# Patient Record
Sex: FEMALE | Race: WHITE | NOT HISPANIC OR LATINO | Employment: OTHER | URBAN - METROPOLITAN AREA
[De-identification: names, ages, dates, MRNs, and addresses within clinical notes are randomized per-mention and may not be internally consistent; named-entity substitution may affect disease eponyms.]

---

## 2017-03-06 ENCOUNTER — RESULTS ENCOUNTER (OUTPATIENT)
Dept: FAMILY MEDICINE CLINIC | Facility: CLINIC | Age: 52
End: 2017-03-06

## 2017-03-06 DIAGNOSIS — E78.00 HYPERCHOLESTEROLEMIA: ICD-10-CM

## 2017-03-17 ENCOUNTER — HOSPITAL ENCOUNTER (OUTPATIENT)
Dept: MAMMOGRAPHY | Facility: HOSPITAL | Age: 52
Discharge: HOME | End: 2017-03-17
Attending: NURSE PRACTITIONER | Admitting: NURSE PRACTITIONER

## 2017-03-17 ENCOUNTER — HOSPITAL ENCOUNTER (OUTPATIENT)
Dept: ULTRASOUND IMAGING | Facility: HOSPITAL | Age: 52
Discharge: HOME | End: 2017-03-17

## 2017-03-17 DIAGNOSIS — R92.8 ABNORMAL MAMMOGRAM: ICD-10-CM

## 2017-03-17 PROCEDURE — 76642 ULTRASOUND BREAST LIMITED: CPT | Performed by: RADIOLOGY

## 2017-03-17 PROCEDURE — 77062 BREAST TOMOSYNTHESIS BI: CPT | Performed by: RADIOLOGY

## 2017-03-17 PROCEDURE — G0204 DX MAMMO INCL CAD BI: HCPCS

## 2017-03-17 PROCEDURE — 77066 DX MAMMO INCL CAD BI: CPT | Performed by: RADIOLOGY

## 2017-03-17 PROCEDURE — G0279 TOMOSYNTHESIS, MAMMO: HCPCS

## 2017-03-17 PROCEDURE — 76642 ULTRASOUND BREAST LIMITED: CPT

## 2017-08-28 ENCOUNTER — TELEPHONE (OUTPATIENT)
Dept: FAMILY MEDICINE CLINIC | Facility: CLINIC | Age: 52
End: 2017-08-28

## 2017-08-28 DIAGNOSIS — E78.00 HYPERCHOLESTEROLEMIA: Primary | ICD-10-CM

## 2017-08-28 NOTE — TELEPHONE ENCOUNTER
Please call.  Orders placed.  I placed an order for a blood count, comprehensive metabolic panel for liver, kidney function and sugar and also lipid panel.

## 2017-08-28 NOTE — TELEPHONE ENCOUNTER
----- Message from Kerrie Spears sent at 8/28/2017 10:21 AM EDT -----  Contact: SHANTELL ALLEN  PATIENT HAS SCHEDULED HER MED REV APPOINTMENT ON 9 1 17 IS WANTING THE LAB ORDERS WRITTEN TODAY SO SHE CAN HAVE HER LABS DONE ASAP BEFORE HER APPOINTMENT PATIENT CAN BE REACHED [phone]

## 2017-08-29 ENCOUNTER — RESULTS ENCOUNTER (OUTPATIENT)
Dept: FAMILY MEDICINE CLINIC | Facility: CLINIC | Age: 52
End: 2017-08-29

## 2017-08-29 ENCOUNTER — TELEPHONE (OUTPATIENT)
Dept: FAMILY MEDICINE CLINIC | Facility: CLINIC | Age: 52
End: 2017-08-29

## 2017-08-29 DIAGNOSIS — E78.00 HYPERCHOLESTEROLEMIA: ICD-10-CM

## 2017-08-29 DIAGNOSIS — E78.00 HYPERCHOLESTEROLEMIA: Primary | ICD-10-CM

## 2017-08-29 NOTE — TELEPHONE ENCOUNTER
SPOKE WITH PT AND SHE WENT TO [facility] TO HAVE LABS DRAWN AND ORDERS WEREN'T SIGNED BY DR. 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

## 2017-08-29 NOTE — TELEPHONE ENCOUNTER
I have reprinted and signed orders. I thought she was having drawn here so signature would not have been needed. [email or web address]

## 2017-09-01 ENCOUNTER — OFFICE VISIT (OUTPATIENT)
Dept: FAMILY MEDICINE CLINIC | Facility: CLINIC | Age: 52
End: 2017-09-01

## 2017-09-01 VITALS
SYSTOLIC BLOOD PRESSURE: 142 MMHG | RESPIRATION RATE: 12 BRPM | TEMPERATURE: 97.3 F | OXYGEN SATURATION: 96 % | WEIGHT: 160.5 LBS | HEIGHT: 64 IN | BODY MASS INDEX: 27.4 KG/M2 | HEART RATE: 91 BPM | DIASTOLIC BLOOD PRESSURE: 88 MMHG

## 2017-09-01 DIAGNOSIS — E78.00 HYPERCHOLESTEROLEMIA: Primary | ICD-10-CM

## 2017-09-01 DIAGNOSIS — R73.9 HYPERGLYCEMIA: ICD-10-CM

## 2017-09-01 PROCEDURE — 99214 OFFICE O/P EST MOD 30 MIN: CPT | Performed by: FAMILY MEDICINE

## 2017-09-01 RX ORDER — ATORVASTATIN CALCIUM 20 MG/1
20 TABLET, FILM COATED ORAL DAILY
Qty: 90 TABLET | Refills: 3 | Status: SHIPPED | OUTPATIENT
Start: 2017-09-01 | End: 2018-11-29 | Stop reason: SDUPTHER

## 2017-09-01 NOTE — PROGRESS NOTES
"  Chief Complaint   Patient presents with   • Hyperlipidemia   • Med Refill       Subjective     Hyperlipidemia   This is a chronic problem. The current episode started more than 1 year ago. The problem is controlled. Recent lipid tests were reviewed and are normal. She has no history of chronic renal disease, diabetes, hypothyroidism or obesity. There are no known factors aggravating her hyperlipidemia. Pertinent negatives include no myalgias. Current antihyperlipidemic treatment includes statins. The current treatment provides moderate improvement of lipids. There are no compliance problems.  There are no known risk factors for coronary artery disease.       Hyperlipidemia  on lipitor  no side effects  had [place] night before the labs    Hyperglycemia  no increased freq urine  no increased thirst  no wt loss  + milkshakes 2 times per week  some sweets      had knee surg last summer, + OA in left knee, gel injections x 2. Last was 2 weeks ago and helps and tolerable    tube in left ear after increased flying. Dx ETD and meniere's diz. + helped with tube.   Dr Vo did this  no dizziness now, sporadic now  hearing has improved      Boot camp exercise and gained some weight, 3 pounds        Past Medical History,Medications, Allergies, and social history was reviewed.    Review of Systems   Constitutional: Negative.    HENT: Negative.    Respiratory: Negative.    Cardiovascular: Negative.    Gastrointestinal: Negative.    Musculoskeletal: Negative.  Negative for myalgias.   Neurological: Negative.    Psychiatric/Behavioral: Negative.        Objective     Vitals:    09/01/17 1124 09/01/17 1149   BP: 140/76 142/88   Pulse: 91    Resp: 12    Temp: 97.3 °F (36.3 °C)    TempSrc: Temporal Artery     SpO2: 96%    Weight: 160 lb 8 oz (72.8 kg)    Height: 64.25\" (163.2 cm)         Physical Exam   Constitutional: She is oriented to person, place, and time. She appears well-developed and well-nourished.   HENT:   Head: " Normocephalic and atraumatic.   Right Ear: Hearing, tympanic membrane, external ear and ear canal normal.   Left Ear: Hearing, tympanic membrane, external ear and ear canal normal.   Mouth/Throat: Oropharynx is clear and moist.   Eyes: Conjunctivae and EOM are normal. Pupils are equal, round, and reactive to light.   Neck: Normal range of motion. Neck supple. No thyromegaly present.   Cardiovascular: Normal rate, regular rhythm and normal heart sounds.  Exam reveals no gallop and no friction rub.    No murmur heard.  Pulmonary/Chest: Effort normal and breath sounds normal. No respiratory distress. She has no wheezes. She has no rales.   Abdominal: Soft. Bowel sounds are normal. She exhibits no distension. There is no tenderness. There is no rebound and no guarding.   Musculoskeletal: She exhibits no edema.   Neurological: She is alert and oriented to person, place, and time.   Skin: Skin is warm and dry.   Psychiatric: She has a normal mood and affect. Her behavior is normal.   Nursing note and vitals reviewed.        Assessment/Plan     Problem List Items Addressed This Visit        Cardiovascular and Mediastinum    Hypercholesterolemia - Primary    Relevant Medications    atorvastatin (LIPITOR) 20 MG tablet      Other Visit Diagnoses     Hyperglycemia        Relevant Orders    Hemoglobin A1c           Follow up: Return if symptoms worsen or fail to improve, for follow up depends on review of labs and testing.         DISCUSSION    Rec check A1c.  Given elevation in blood sugar of 151 fasting.  If A1c is greater than 8, we will need to start oral medications.  If not, can cahnge diet and increase exercise to help control this.  We will call her with results next week.    Hyperlipidemia.  Stable.  Continue medications.  Cholesterol is doing quite well.        MEDICATIONS PRESCRIBED  Requested Prescriptions     Signed Prescriptions Disp Refills   • atorvastatin (LIPITOR) 20 MG tablet 90 tablet 3     Sig: Take 1  tablet by mouth Daily.          Camilo Garcia MD

## 2017-09-02 LAB — HBA1C MFR BLD: 6.3 % (ref 4.8–5.6)

## 2017-09-05 ENCOUNTER — TELEPHONE (OUTPATIENT)
Dept: FAMILY MEDICINE CLINIC | Facility: CLINIC | Age: 52
End: 2017-09-05

## 2017-09-05 NOTE — TELEPHONE ENCOUNTER
It is possible that the steroid injections could cause the increased sugar and then add in some increased desserts and sweets and will be worse.  Would still recommend decreasing sweets and rechecking in 3 months.  See result note if patient not notified yet.  A1c was 6.3.

## 2017-09-05 NOTE — TELEPHONE ENCOUNTER
----- Message from Selena Nayak sent at 9/5/2017 12:12 PM EDT -----  Contact: SHANTELL;PT CALLED  PT STATES SHE HAS HAD 2 STEROID INJECTIONS IN KNEE THIS YEAR (ONE TWO WEEKS AGO AND ONE IN MARCH) AND  WONDERING IF THIS WOULD HAVE RAISED HER SUGAR LEVEL    [phone]  MESSAGE OK

## 2017-10-17 ENCOUNTER — OFFICE VISIT (OUTPATIENT)
Dept: FAMILY MEDICINE CLINIC | Facility: CLINIC | Age: 52
End: 2017-10-17

## 2017-10-17 VITALS
BODY MASS INDEX: 27.76 KG/M2 | HEART RATE: 64 BPM | RESPIRATION RATE: 20 BRPM | TEMPERATURE: 98.6 F | HEIGHT: 64 IN | WEIGHT: 162.6 LBS | SYSTOLIC BLOOD PRESSURE: 130 MMHG | DIASTOLIC BLOOD PRESSURE: 86 MMHG

## 2017-10-17 DIAGNOSIS — J20.8 ACUTE BRONCHITIS DUE TO OTHER SPECIFIED ORGANISMS: Primary | ICD-10-CM

## 2017-10-17 PROCEDURE — 99213 OFFICE O/P EST LOW 20 MIN: CPT | Performed by: PHYSICIAN ASSISTANT

## 2017-10-17 RX ORDER — AZITHROMYCIN 250 MG/1
TABLET, FILM COATED ORAL
Qty: 6 TABLET | Refills: 0 | Status: SHIPPED | OUTPATIENT
Start: 2017-10-17 | End: 2018-09-07

## 2017-10-17 NOTE — PROGRESS NOTES
Subjective   Aysha Reaves is a 52 y.o. female.     Cough   This is a new problem. The current episode started 1 to 4 weeks ago. The problem has been unchanged. The problem occurs every few minutes. The cough is productive of sputum. Associated symptoms include ear congestion, nasal congestion, postnasal drip, rhinorrhea and a sore throat. Pertinent negatives include no chest pain, chills, ear pain, fever, headaches, heartburn, hemoptysis, myalgias, rash, shortness of breath, sweats, weight loss or wheezing. Nothing aggravates the symptoms. 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 She has tried OTC cough suppressant and rest (sudafed, antihistamine ) for the symptoms. The treatment provided no relief. Her past medical history is significant for environmental allergies. There is no history of asthma or COPD.        The following portions of the patient's history were reviewed and updated as appropriate: allergies, current medications, past family history, past medical history, past social history, past surgical history and problem list.    Review of Systems   Constitutional: Positive for activity change and fatigue. Negative for chills, diaphoresis, fever and weight loss.   HENT: Positive for congestion, postnasal drip, rhinorrhea, sinus pressure and sore throat. Negative for ear discharge, ear pain, hearing loss, nosebleeds, sinus pain, sneezing and trouble swallowing.    Eyes: Negative.    Respiratory: Positive for cough. Negative for hemoptysis, chest tightness, shortness of breath and wheezing.    Cardiovascular: Negative.  Negative for chest pain, palpitations and leg swelling.   Gastrointestinal: Negative for abdominal distention, abdominal pain, anal bleeding, blood in stool, constipation, diarrhea, heartburn, nausea, rectal pain and vomiting.   Endocrine: Negative.  Negative for cold intolerance, heat intolerance, polydipsia, polyphagia and polyuria.  "  Genitourinary: Negative.  Negative for difficulty urinating, dysuria, flank pain, frequency, hematuria and urgency.   Musculoskeletal: Negative.  Negative for arthralgias, back pain, gait problem, joint swelling, myalgias, neck pain and neck stiffness.   Skin: Negative.  Negative for color change, pallor, rash and wound.   Allergic/Immunologic: Positive for environmental allergies. Negative for immunocompromised state.   Neurological: Negative for dizziness, syncope, weakness, light-headedness, numbness and headaches.   Hematological: Negative.  Negative for adenopathy. Does not bruise/bleed easily.   Psychiatric/Behavioral: Negative.  Negative for behavioral problems, confusion, self-injury, sleep disturbance and suicidal ideas. The patient is not nervous/anxious.        Objective    Blood pressure 130/86, pulse 64, temperature 98.6 °F (37 °C), resp. rate 20, height 64.25\" (163.2 cm), weight 162 lb 9.6 oz (73.8 kg).     Physical Exam   Constitutional: She is oriented to person, place, and time. She appears well-developed and well-nourished.   HENT:   Head: Normocephalic and atraumatic.   Right Ear: External ear and ear canal normal. Tympanic membrane is not perforated and not erythematous. A middle ear effusion is present.   Left Ear: External ear and ear canal normal.   Nose: Mucosal edema and rhinorrhea present. Right sinus exhibits maxillary sinus tenderness. Right sinus exhibits no frontal sinus tenderness. Left sinus exhibits no maxillary sinus tenderness and no frontal sinus tenderness.   Mouth/Throat: Posterior oropharyngeal erythema present. No oropharyngeal exudate or posterior oropharyngeal edema.   L eustachian tube visible and in place    Eyes: Conjunctivae and EOM are normal. Pupils are equal, round, and reactive to light.   Neck: Normal range of motion. Neck supple. No tracheal deviation present. No thyromegaly present.   Cardiovascular: Normal rate, regular rhythm, normal heart sounds and intact " distal pulses.  Exam reveals no gallop and no friction rub.    No murmur heard.  Pulmonary/Chest: Effort normal and breath sounds normal. No respiratory distress. She has no wheezes. She has no rales. She exhibits no tenderness.   Abdominal: Soft. Bowel sounds are normal. She exhibits no distension and no mass. There is no tenderness. There is no rebound and no guarding. No hernia.   Lymphadenopathy:     She has no cervical adenopathy.   Neurological: She is alert and oriented to person, place, and time.   Skin: Skin is warm and dry.   Psychiatric: She has a normal mood and affect. Her behavior is normal. Judgment and thought content normal.   Nursing note and vitals reviewed.      Assessment/Plan   Aysha was seen today for cough.    Diagnoses and all orders for this visit:    Acute bronchitis due to other specified organisms  -     azithromycin (ZITHROMAX Z-MATEO) 250 MG tablet; Take 2 tablets the first day, then 1 tablet daily for 4 days.    treatment as outlined in plan. F/U INB

## 2017-11-06 ENCOUNTER — CLINICAL SUPPORT (OUTPATIENT)
Dept: FAMILY MEDICINE CLINIC | Facility: CLINIC | Age: 52
End: 2017-11-06

## 2017-11-06 DIAGNOSIS — Z28.39 IMMUNIZATIONS INCOMPLETE: Primary | ICD-10-CM

## 2017-11-06 PROCEDURE — 90471 IMMUNIZATION ADMIN: CPT | Performed by: FAMILY MEDICINE

## 2017-11-06 PROCEDURE — 90686 IIV4 VACC NO PRSV 0.5 ML IM: CPT | Performed by: FAMILY MEDICINE

## 2018-04-13 ENCOUNTER — TRANSCRIBE ORDERS (OUTPATIENT)
Dept: ADMINISTRATIVE | Facility: HOSPITAL | Age: 53
End: 2018-04-13

## 2018-04-13 DIAGNOSIS — R92.8 ABNORMAL MAMMOGRAM: Primary | ICD-10-CM

## 2018-04-20 ENCOUNTER — HOSPITAL ENCOUNTER (OUTPATIENT)
Dept: ULTRASOUND IMAGING | Facility: HOSPITAL | Age: 53
Discharge: HOME | End: 2018-04-20

## 2018-04-20 ENCOUNTER — HOSPITAL ENCOUNTER (OUTPATIENT)
Dept: MAMMOGRAPHY | Facility: HOSPITAL | Age: 53
Discharge: HOME | End: 2018-04-20

## 2018-04-20 ENCOUNTER — HOSPITAL ENCOUNTER (OUTPATIENT)
Dept: MAMMOGRAPHY | Facility: HOSPITAL | Age: 53
Discharge: HOME | End: 2018-04-20
Attending: NURSE PRACTITIONER | Admitting: NURSE PRACTITIONER

## 2018-04-20 DIAGNOSIS — R92.8 ABNORMAL MAMMOGRAM: ICD-10-CM

## 2018-04-20 PROCEDURE — 77062 BREAST TOMOSYNTHESIS BI: CPT | Performed by: RADIOLOGY

## 2018-04-20 PROCEDURE — 77066 DX MAMMO INCL CAD BI: CPT | Performed by: RADIOLOGY

## 2018-04-20 PROCEDURE — 19083 BX BREAST 1ST LESION US IMAG: CPT | Performed by: RADIOLOGY

## 2018-04-20 PROCEDURE — 77066 DX MAMMO INCL CAD BI: CPT

## 2018-04-20 PROCEDURE — 76642 ULTRASOUND BREAST LIMITED: CPT | Performed by: RADIOLOGY

## 2018-04-20 PROCEDURE — A4648 IMPLANTABLE TISSUE MARKER: HCPCS

## 2018-04-20 PROCEDURE — G0279 TOMOSYNTHESIS, MAMMO: HCPCS

## 2018-04-20 PROCEDURE — 76642 ULTRASOUND BREAST LIMITED: CPT

## 2018-04-20 PROCEDURE — 88305 TISSUE EXAM BY PATHOLOGIST: CPT | Performed by: NURSE PRACTITIONER

## 2018-04-20 RX ORDER — LIDOCAINE HYDROCHLORIDE AND EPINEPHRINE 10; 10 MG/ML; UG/ML
20 INJECTION, SOLUTION INFILTRATION; PERINEURAL ONCE
Status: COMPLETED | OUTPATIENT
Start: 2018-04-20 | End: 2018-04-20

## 2018-04-20 RX ORDER — LIDOCAINE HYDROCHLORIDE 10 MG/ML
5 INJECTION, SOLUTION INFILTRATION; PERINEURAL ONCE
Status: COMPLETED | OUTPATIENT
Start: 2018-04-20 | End: 2018-04-20

## 2018-04-20 RX ADMIN — LIDOCAINE HYDROCHLORIDE AND EPINEPHRINE 3 ML: 10; 10 INJECTION, SOLUTION INFILTRATION; PERINEURAL at 16:25

## 2018-04-20 RX ADMIN — LIDOCAINE HYDROCHLORIDE 3 ML: 10 INJECTION, SOLUTION INFILTRATION; PERINEURAL at 16:22

## 2018-04-23 LAB
CYTO UR: NORMAL
LAB AP CASE REPORT: NORMAL
LAB AP CLINICAL INFORMATION: NORMAL
LAB AP DIAGNOSIS COMMENT: NORMAL
Lab: NORMAL
PATH REPORT.FINAL DX SPEC: NORMAL
PATH REPORT.GROSS SPEC: NORMAL

## 2018-04-24 ENCOUNTER — TELEPHONE (OUTPATIENT)
Dept: MAMMOGRAPHY | Facility: HOSPITAL | Age: 53
End: 2018-04-24

## 2018-05-22 ENCOUNTER — TELEPHONE (OUTPATIENT)
Dept: GENETICS | Facility: HOSPITAL | Age: 53
End: 2018-05-22

## 2018-05-29 ENCOUNTER — APPOINTMENT (OUTPATIENT)
Dept: GENETICS | Facility: HOSPITAL | Age: 53
End: 2018-05-29

## 2018-07-09 ENCOUNTER — CLINICAL SUPPORT (OUTPATIENT)
Dept: GENETICS | Facility: HOSPITAL | Age: 53
End: 2018-07-09

## 2018-07-09 DIAGNOSIS — Z80.0 FAMILY HISTORY OF PANCREATIC CANCER: ICD-10-CM

## 2018-07-09 DIAGNOSIS — Z80.0 FAMILY HISTORY OF COLON CANCER: ICD-10-CM

## 2018-07-09 DIAGNOSIS — Z80.3 FAMILY HISTORY OF BREAST CANCER: Primary | ICD-10-CM

## 2018-07-09 DIAGNOSIS — Z80.41 FAMILY HISTORY OF OVARIAN CANCER: ICD-10-CM

## 2018-07-09 PROCEDURE — 96040: CPT | Performed by: GENETIC COUNSELOR, MS

## 2018-07-10 NOTE — PROGRESS NOTES
Aysha Reaves, a 52-year-old female, was seen for genetic counseling due to a family history of breast cancer. Ms. Reaves was 11 years old at menarche and had her first child at age 27.  She reports that she had a unilateral oophorectomy at age 19, and then at age 34 she had a total hysterectomy and her remaining ovary removed. She recently had a benign breast biopsy in April 2018. She reports she has used hormone replacement therapy since age 34. Her current cancer screening includes annual clinical breast exam, annual mammogram, and colonoscopy every ten years. She was interested in discussing genetic testing recommendations as well as her risks for breast cancer and appropriate management.      PERTINENT FAMILY HISTORY: (See attached pedigree)   Mother:   Breast cancer, 80  Sister:    Breast cancer, 55  Mat. Aunt   Breast cancer, 40s; Possible ovarian cancer, 40s or 50s  Mat. Aunt:   Breast cancer, 65  Mat. Aunt:   Breast cancer, 68  Mat. Aunt:   Breast cancer, 62  Mat. First Cousin:  Breast cancer, 44  Mat. First Cousin:  Pancreatic cancer, 55  Mat. Grandfather:  Possible lung cancer  Pat. Aunt:   Breast cancer, 65  Pat. Aunt:   Metastatic colon cancer, 80  Pat. Grandmother:  Ovarian cancer, 39    We do not currently have records regarding any of these diagnoses.      RISK ASSESSMENT:  Ms. Reaves’ family history of breast cancer raises the question of a hereditary cancer syndrome.  Ms. Reaves meets the NCCN guidelines criteria for BRCA1/2 testing based on the presence of three women on her maternal side of the family diagnosed with breast cancer, as well as her paternal grandmother’s reported history of ovarian cancer. In cases where an affected relative is not available for testing or not willing to pursue testing, it is appropriate to offer testing to an unaffected individual.  We also discussed the presence of other cancer susceptibility genes and the ability to test for these in addition to BRCA1/2 through a  multigene panel.     If Ms. Reaves has genetic testing and it is negative, or she opts not to pursue testing, her management should be guided by a family history risk assessment.  We discussed Ms. Reaves’ breast cancer risk based on family history risk assessments. Computer modeling estimates that Ms. Reaves’ lifetime personal risk for developing breast cancer is up to 21.8% (UTE/Tyrer-Cuzick model), which is increased above the general population risk of 12.5%. In general, a lifetime risk above 20% is considered to be “high risk” where increased screening is warranted. This risk assessment is based on the family history information provided at the time of the appointment. The assessment could change in the future should new information be obtained.    GENETIC COUNSELING (90 minutes):  We reviewed the family history information in detail.  Cancer is very common; approximately 1 in 3 individuals will develop cancer within a lifetime.  It is not uncommon to see multiple individuals within a family with cancer given the high chance for a person to develop cancer.  The interaction of many factors determines each person’s personal risk, including age, family or personal history of cancer, and external factors such as diet and environmental exposures.  Most often, we believe cancer to be a multifactorial disease caused by an accumulation of genetic alterations acquired over our lifetime, with environmental factors acting in the development of a malignancy.    Cancer cases follow three general patterns: sporadic, familial, and hereditary.  While most breast cancer is sporadic, some cases appear to occur in family clusters.  These cases are said to be familial and account for 10-20% of breast cancer cases.  Familial cases may be due to a combination of shared genes and environmental factors among family members.  In even fewer families, the cancer is said to be inherited, and the genes responsible for the cancer are known.    Family histories typical of hereditary cancer syndromes usually include multiple first- and second-degree relatives diagnosed with cancer types that define a syndrome.  These cases tend to be diagnosed at younger- than-expected ages and can be bilateral or multifocal.  The cancer in these families follows an autosomal dominant inheritance pattern, which indicates the likely presence of a mutation in a cancer susceptibility gene.  Children and siblings of an individual believed to carry this mutation have a 50% chance of inheriting that mutation, thereby inheriting the increased risk to develop cancer.    Hereditary breast cancer accounts for 5-10% of all cases of breast cancer.  A significant proportion of hereditary breast and ovarian cancer can be attributed to mutations in the BRCA1 and BRCA2 genes.  The cancer in these families follows an autosomal dominant pattern of inheritance.  Mutations in these genes confer an increased risk for breast cancer, ovarian cancer, male breast cancer, prostate cancer, and pancreatic cancer. Women with a BRCA1 or BRCA2 mutation have up to an 87% lifetime risk of breast cancer and up to a 44% risk of ovarian cancer. We discussed the management guidelines established for BRCA mutation carriers including increased screening and surgical risk reduction options.  There are other genes that can cause a hereditary risk for cancer, and we discussed the availability of multigene panels which allow for testing of BRCA1/2 and a number of other cancer susceptibility genes simultaneously. Genes included on these panels have varying degrees of risk associated.  There are limitations to this testing, one being that some of the genes included on these panels have been more recently described, therefore there may be less data regarding the associated risks and there may not be established management guidelines at this time.    GENETIC TESTING:  The risks, benefits and limitations of genetic  testing and implications for clinical management following testing were reviewed.  DNA test results can influence decisions regarding screening and prevention.  Genetic testing can have significant psychological implications for both individuals and families. Also discussed was the possibility of employment and insurance discrimination based on genetic test results and the laws in place to prevent this ([place]).      The implications of a positive or negative test result were discussed.  The limited value of negative test results was emphasized, particularly given the significant population risk for breast cancer and the existence of other cancer susceptibility genes.  The importance of initiating testing on an affected family member was emphasized.  In general, a negative genetic test is most informative if a mutation has first been established in an affected member of the family. In cases where an affected individual is unavailable or unwilling to pursue testing, testing an unaffected individual can be considered.  We also discussed the possibility of identifying a variant of uncertain significance (VUS), which is a change in a gene that may or may not impact cancer risks.  The majority of VUS’s are ultimately reclassified to a negative result.  Ms. Reaves is going to consider the information we discussed today and discuss further with her family, and plans to return for genetic counseling on 7/31/18 to have her blood drawn for testing.     CLINICAL MANAGEMENT GUIDELINES: Ms. Reaves’ lifetime risk for breast cancer is estimated to be above 20% based on her family history. Given Ms. Reaves’ increased risk, options available to individuals with a high lifetime risk for breast cancer were discussed, including increased surveillance and chemoprevention.    Increased surveillance, based on NCCN guidelines, would consist of semi-annual clinical breast exams and monthly self-breast exams starting by age 18 and annual  mammography starting 10 years younger than the earliest diagnosis in the family, or by age 40, whichever is earliest. According to an American Cancer Society expert panel and NCCN guidelines, annual breast MRI should be offered to women whose lifetime risk of breast cancer is 20-25 percent or more, typically beginning at the same age as mammography. Breast cancer chemoprevention is another option that can be considered for women with an elevated risk of breast cancer. Studies have shown that chemoprevention, such as Tamoxifen and Raloxifene, can cut the risk of estrogen receptor positive breast cancer by up to 50% when taken by high-risk women over a 5-year period. There are risks and side effects associated with these medications; therefore, the risks versus benefits must be considered prior to deciding to take chemopreventative medications.   These recommendations could change if Ms. Reaves pursues genetic testing and tests positive for a genetic mutation.       PLAN:  Ms. Reaves plans to return for genetic counseling on 7/31/18 to sign consent forms and have her blood drawn for genetic testing. She is welcome to contact us in the meantime if she has any questions, concerns, or updates to the family history.    Azul Mcadams, MS, [facility] [place], C  Licensed Certified Genetic Counselor       Cc: Aysha Parks CNM

## 2018-07-31 ENCOUNTER — CLINICAL SUPPORT (OUTPATIENT)
Dept: GENETICS | Facility: HOSPITAL | Age: 53
End: 2018-07-31

## 2018-07-31 DIAGNOSIS — Z80.3 FAMILY HISTORY OF BREAST CANCER: Primary | ICD-10-CM

## 2018-08-02 NOTE — PROGRESS NOTES
Aysha Reaves was initially seen for genetic counseling on 7/9/18 due to a family history of breast and ovarian cancer. Ms. Reaves did not pursue testing at that time because she wanted to discuss this information with her family and gather more information. She returned for genetic counseling today to discussed updates to the family history, and reported that she would like to pursue multigene panel testing, specifically expressing preference to use the lab [facility]. However, due to concerns regarding obtaining additional life insurance,  Ms. Reaves opted to not pursue testing at this time. She requested to be scheduled for follow up on 8/24/18 to potentially pursue testing and have her blood drawn at that time.  Documentation from initial genetic counseling visit on 7/9/18 can be found in EPIC chart.         Azul Mcadams MS, [facility], [facility]  Licensed Certified Genetic Counselor

## 2018-09-07 ENCOUNTER — OFFICE VISIT (OUTPATIENT)
Dept: FAMILY MEDICINE CLINIC | Facility: CLINIC | Age: 53
End: 2018-09-07

## 2018-09-07 VITALS
HEIGHT: 64 IN | SYSTOLIC BLOOD PRESSURE: 144 MMHG | DIASTOLIC BLOOD PRESSURE: 76 MMHG | RESPIRATION RATE: 18 BRPM | WEIGHT: 159 LBS | HEART RATE: 56 BPM | TEMPERATURE: 98.5 F | BODY MASS INDEX: 27.14 KG/M2 | OXYGEN SATURATION: 100 %

## 2018-09-07 DIAGNOSIS — E78.00 HYPERCHOLESTEROLEMIA: ICD-10-CM

## 2018-09-07 DIAGNOSIS — R68.89 SENSATION OF SWOLLEN THROAT: ICD-10-CM

## 2018-09-07 DIAGNOSIS — Z11.59 NEED FOR HEPATITIS C SCREENING TEST: ICD-10-CM

## 2018-09-07 DIAGNOSIS — J02.9 SORE THROAT: Primary | ICD-10-CM

## 2018-09-07 DIAGNOSIS — R73.9 HYPERGLYCEMIA: ICD-10-CM

## 2018-09-07 PROBLEM — N20.0 KIDNEY STONE ON LEFT SIDE: Status: ACTIVE | Noted: 2018-09-07

## 2018-09-07 PROBLEM — N20.0 KIDNEY STONE ON LEFT SIDE: Status: RESOLVED | Noted: 2018-09-07 | Resolved: 2018-09-07

## 2018-09-07 LAB
EXPIRATION DATE: NORMAL
INTERNAL CONTROL: NORMAL
Lab: NORMAL
S PYO AG THROAT QL: NEGATIVE

## 2018-09-07 PROCEDURE — 99214 OFFICE O/P EST MOD 30 MIN: CPT | Performed by: FAMILY MEDICINE

## 2018-09-07 PROCEDURE — 87880 STREP A ASSAY W/OPTIC: CPT | Performed by: FAMILY MEDICINE

## 2018-09-07 RX ORDER — AMOXICILLIN 500 MG/1
1000 CAPSULE ORAL 2 TIMES DAILY
Qty: 28 CAPSULE | Refills: 0 | Status: SHIPPED | OUTPATIENT
Start: 2018-09-07 | End: 2018-12-07

## 2018-09-07 NOTE — PROGRESS NOTES
Assessment/Plan       Problems Addressed this Visit        Cardiovascular and Mediastinum    Hypercholesterolemia    Relevant Orders    Lipid Panel With / Chol / HDL Ratio    Comprehensive Metabolic Panel      Other Visit Diagnoses     Sore throat    -  Primary    Relevant Medications    amoxicillin (AMOXIL) 500 MG capsule    Other Relevant Orders    POCT rapid strep A (Completed)    Sensation of swollen throat        Relevant Orders    POCT rapid strep A (Completed)    Hyperglycemia        Relevant Orders    Hemoglobin A1c    Need for hepatitis C screening test        Relevant Orders    Hepatitis C Antibody            Follow up: Return if symptoms worsen or fail to improve.     DISCUSSION  Sore throat.  Absence of significant congestion with exposure to strep.  We will treat empirically in spite of the strep test being negative.  She agrees.  Start amoxicillin as noted.    History of hyperglycemia, hypercholesterolemia.  Check labs as noted.  She will follow-up and do this fasting her next Friday off.    Also check hepatitis C screening.      MEDICATIONS PRESCRIBED  Requested Prescriptions     Signed Prescriptions Disp Refills   • amoxicillin (AMOXIL) 500 MG capsule 28 capsule 0     Sig: Take 2 capsules by mouth 2 (Two) Times a Day.            -------------------------------------------    Subjective     Chief Complaint   Patient presents with   • Sore Throat     swollen throat, since monday and progessive more annoying    • Cough         Sore Throat    This is a new problem. Episode onset: 5 days. The problem has been unchanged. There has been no fever. Associated symptoms include congestion, coughing (at night, throat feels swollen) and headaches. Associated symptoms comments: No runny nose and sl congestion. She has had exposure to strep (in classroom , + confirmed). Treatments tried: decongestants.     Out of town next week  [place]    Had tube int he left ear 1 yr ago and came out 1 week ago  +  "helped    History of elevated glucose and hypercholesterolemia.  Due for blood work.  Currently on atorvastatin.  No side effects.  Has never formally been diagnosed with diabetes.  Has had mild elevation.  No side effects of the atorvastatin.  No myalgias.    History   Smoking Status   • Never Smoker   Smokeless Tobacco   • Never Used        Past Medical History,Medications, Allergies, and social history was reviewed.      Review of Systems   Constitutional: Positive for fatigue.   HENT: Positive for congestion and sore throat.    Respiratory: Positive for cough (at night, throat feels swollen).    Cardiovascular: Negative.    Gastrointestinal: Negative.    Psychiatric/Behavioral: Negative.        Objective     Vitals:    09/07/18 1148   BP: 144/76   Pulse: 56   Resp: 18   Temp: 98.5 °F (36.9 °C)   TempSrc: Temporal Artery    SpO2: 100%   Weight: 72.1 kg (159 lb)   Height: 163.2 cm (64.25\")          Physical Exam   Constitutional: She is oriented to person, place, and time. She appears well-developed and well-nourished.   HENT:   Head: Normocephalic and atraumatic.   Right Ear: Hearing, tympanic membrane, external ear and ear canal normal.   Left Ear: Hearing, tympanic membrane, external ear and ear canal normal.   Mouth/Throat: Mucous membranes are normal. Posterior oropharyngeal erythema present. No oropharyngeal exudate.   TMs no perforation   Eyes: Pupils are equal, round, and reactive to light. Conjunctivae and EOM are normal.   Neck: Normal range of motion. Neck supple. No thyromegaly present.   Cardiovascular: Normal rate, regular rhythm and normal heart sounds.  Exam reveals no gallop and no friction rub.    No murmur heard.  Pulmonary/Chest: Effort normal and breath sounds normal. No respiratory distress. She has no wheezes. She has no rales.   Musculoskeletal: She exhibits no edema.   Lymphadenopathy:     She has cervical adenopathy (anterior mild).   Neurological: She is alert and oriented to person, " place, and time.   Skin: Skin is warm and dry.   Psychiatric: She has a normal mood and affect. Her behavior is normal.   Nursing note and vitals reviewed.    Rapid strep Neg            Camilo Garcia MD

## 2018-09-28 ENCOUNTER — RESULTS ENCOUNTER (OUTPATIENT)
Dept: FAMILY MEDICINE CLINIC | Facility: CLINIC | Age: 53
End: 2018-09-28

## 2018-09-28 DIAGNOSIS — Z11.59 NEED FOR HEPATITIS C SCREENING TEST: ICD-10-CM

## 2018-09-28 DIAGNOSIS — E78.00 HYPERCHOLESTEROLEMIA: ICD-10-CM

## 2018-09-28 DIAGNOSIS — R73.9 HYPERGLYCEMIA: ICD-10-CM

## 2018-10-01 ENCOUNTER — TRANSCRIBE ORDERS (OUTPATIENT)
Dept: ADMINISTRATIVE | Facility: HOSPITAL | Age: 53
End: 2018-10-01

## 2018-10-01 DIAGNOSIS — R92.8 ABNORMAL MAMMOGRAPHY: Primary | ICD-10-CM

## 2018-11-29 ENCOUNTER — TELEPHONE (OUTPATIENT)
Dept: FAMILY MEDICINE CLINIC | Facility: CLINIC | Age: 53
End: 2018-11-29

## 2018-11-29 DIAGNOSIS — E78.00 HYPERCHOLESTEROLEMIA: ICD-10-CM

## 2018-11-29 RX ORDER — ATORVASTATIN CALCIUM 20 MG/1
20 TABLET, FILM COATED ORAL DAILY
Qty: 90 TABLET | Refills: 0 | Status: SHIPPED | OUTPATIENT
Start: 2018-11-29 | End: 2018-12-07 | Stop reason: SDUPTHER

## 2018-11-29 NOTE — TELEPHONE ENCOUNTER
Please call. I sent in atorvastatin and she is due for follow up office visit and labs. There are orders in system from Sept to have labs done.

## 2018-11-29 NOTE — TELEPHONE ENCOUNTER
----- Message from Brandy Etienne Rep sent at 11/29/2018  2:49 PM EST -----  Contact: CANDICE @ [facility]; SHANTELL Vázquezill    Atorvastatin 20 mg     Phone; [phone]

## 2018-11-30 ENCOUNTER — TELEPHONE (OUTPATIENT)
Dept: FAMILY MEDICINE CLINIC | Facility: CLINIC | Age: 53
End: 2018-11-30

## 2018-11-30 ENCOUNTER — TRANSCRIBE ORDERS (OUTPATIENT)
Dept: ADMINISTRATIVE | Facility: HOSPITAL | Age: 53
End: 2018-11-30

## 2018-11-30 DIAGNOSIS — R92.8 ABNORMAL MAMMOGRAM: Primary | ICD-10-CM

## 2018-11-30 NOTE — TELEPHONE ENCOUNTER
PT WOULD LIKE LAB ORDERS SENT TO [facility], AS SHE PLANS ON HAVING THEM DRAWN TOMORROW MORNING.    PT CALL BACK [phone]

## 2018-12-06 ENCOUNTER — TRANSCRIBE ORDERS (OUTPATIENT)
Dept: ADMINISTRATIVE | Facility: HOSPITAL | Age: 53
End: 2018-12-06

## 2018-12-06 DIAGNOSIS — R92.8 ABNORMAL MAMMOGRAM: Primary | ICD-10-CM

## 2018-12-07 ENCOUNTER — OFFICE VISIT (OUTPATIENT)
Dept: FAMILY MEDICINE CLINIC | Facility: CLINIC | Age: 53
End: 2018-12-07

## 2018-12-07 ENCOUNTER — TELEPHONE (OUTPATIENT)
Dept: FAMILY MEDICINE CLINIC | Facility: CLINIC | Age: 53
End: 2018-12-07

## 2018-12-07 VITALS
HEIGHT: 64 IN | SYSTOLIC BLOOD PRESSURE: 140 MMHG | DIASTOLIC BLOOD PRESSURE: 82 MMHG | TEMPERATURE: 97.8 F | HEART RATE: 72 BPM | WEIGHT: 158 LBS | RESPIRATION RATE: 18 BRPM | BODY MASS INDEX: 26.98 KG/M2

## 2018-12-07 DIAGNOSIS — R73.9 HYPERGLYCEMIA: ICD-10-CM

## 2018-12-07 DIAGNOSIS — Z00.00 WELL ADULT EXAM: Primary | ICD-10-CM

## 2018-12-07 DIAGNOSIS — E78.00 HYPERCHOLESTEROLEMIA: ICD-10-CM

## 2018-12-07 PROCEDURE — 99396 PREV VISIT EST AGE 40-64: CPT | Performed by: FAMILY MEDICINE

## 2018-12-07 RX ORDER — ATORVASTATIN CALCIUM 20 MG/1
20 TABLET, FILM COATED ORAL DAILY
Qty: 90 TABLET | Refills: 3 | Status: SHIPPED | OUTPATIENT
Start: 2018-12-07 | End: 2019-12-08 | Stop reason: SDUPTHER

## 2018-12-07 NOTE — PROGRESS NOTES
Assessment/Plan       Problems Addressed this Visit        Cardiovascular and Mediastinum    Hypercholesterolemia      Other Visit Diagnoses     Well adult exam    -  Primary    Hyperglycemia        Relevant Orders    Hemoglobin A1c            Follow up: Return for Annual physical.     DISCUSSION  Well exam.  Continue routine health maintenance including routine dentistry, eye exams, seatbelt use, exercise and proper nutrition.    Due to mild increase A1c of 6.4, recommend recheck A1c in 6 months.  Prescription given.    Hyperlipidemia.  Continue atorvastatin.  Cholesterol is doing well.  HDL has increased with exercise.    Recent allergy testing.  Was all negative.  May have vasomotor rhinitis.  Allergist is handling.      MEDICATIONS PRESCRIBED  Requested Prescriptions      No prescriptions requested or ordered in this encounter              -------------------------------------------    Subjective     Chief Complaint   Patient presents with   • Annual Exam     F/U labs         History of Present Illness    The patient is a 53 y.o. female who presents with well check up      Nutrition:  Eating healthy  Exercise:  + 3 days per week for 1 hr at the gym, fitness boot camp  Sleep:  no issues      Advanced Directives:  no living will, in process     Last Colonoscopy:  3/2016, NORMAL     Past Gynecological History:     No LMP recorded. Patient has had a hysterectomy.     Eye: has today this afternoon, has had some eye irritation    Dentist: Dr Last      Menses: Hyst        Date of last mammogram: April 2018 and had needle bx and was ok.     Past Medical History,Medications, Allergies reviewed.       Other concerns/problems:     Had hep A in 2013 x 1     Allergies  Had allergy testing done  Head congestion and ear congestion  Had tube placed in the left ear 2 years ago , has ETD  Has had ear infection in the past  All was negative  ? Vasomotor rhinitis        Mother + AD in nursing home [place]    Hyperlipidemia.  On  "atorvastatin.  No side effects.  Doing well.  Recent blood work was good.      Social History     Tobacco Use   Smoking Status Never Smoker   Smokeless Tobacco Never Used        Past Medical History,Medications, Allergies, and social history was reviewed.      Review of Systems   Constitutional: Negative.    HENT: Negative.    Respiratory: Negative.    Cardiovascular: Negative.    Gastrointestinal: Negative.    Genitourinary: Negative.    Musculoskeletal: Negative.    Neurological: Negative.    Psychiatric/Behavioral: Negative.        Objective     Vitals:    12/07/18 1211   BP: 140/82   Pulse: 72   Resp: 18   Temp: 97.8 °F (36.6 °C)   Weight: 71.7 kg (158 lb)   Height: 163.2 cm (64.25\")          Physical Exam   Constitutional: She appears well-developed and well-nourished.   HENT:   Head: Normocephalic and atraumatic.   Right Ear: Hearing, tympanic membrane, external ear and ear canal normal.   Left Ear: Hearing, tympanic membrane, external ear and ear canal normal.   Mouth/Throat: Oropharynx is clear and moist.   Eyes: Conjunctivae and EOM are normal. Pupils are equal, round, and reactive to light.   Neck: Normal range of motion. Neck supple. No thyromegaly present.   Cardiovascular: Normal rate, regular rhythm and normal heart sounds. Exam reveals no gallop and no friction rub.   No murmur heard.  Pulmonary/Chest: Effort normal and breath sounds normal. No respiratory distress. She has no wheezes. She has no rales.   Abdominal: Soft. Bowel sounds are normal. She exhibits no distension. There is no tenderness. There is no rebound and no guarding.   Musculoskeletal: She exhibits no edema.   Neurological: She is alert.   Skin: Skin is warm and dry.   Psychiatric: She has a normal mood and affect.   Nursing note and vitals reviewed.                Camilo Garcia MD    "

## 2019-01-15 ENCOUNTER — APPOINTMENT (OUTPATIENT)
Dept: MAMMOGRAPHY | Facility: HOSPITAL | Age: 54
End: 2019-01-15
Attending: NURSE PRACTITIONER

## 2019-01-15 ENCOUNTER — TRANSCRIBE ORDERS (OUTPATIENT)
Dept: MAMMOGRAPHY | Facility: HOSPITAL | Age: 54
End: 2019-01-15

## 2019-01-15 ENCOUNTER — TELEPHONE (OUTPATIENT)
Dept: FAMILY MEDICINE CLINIC | Facility: CLINIC | Age: 54
End: 2019-01-15

## 2019-01-15 ENCOUNTER — HOSPITAL ENCOUNTER (OUTPATIENT)
Dept: MAMMOGRAPHY | Facility: HOSPITAL | Age: 54
Discharge: HOME | End: 2019-01-15
Attending: NURSE PRACTITIONER | Admitting: NURSE PRACTITIONER

## 2019-01-15 DIAGNOSIS — R92.8 ABNORMAL MAMMOGRAM: ICD-10-CM

## 2019-01-15 DIAGNOSIS — Z12.31 VISIT FOR SCREENING MAMMOGRAM: Primary | ICD-10-CM

## 2019-01-15 PROCEDURE — 77065 DX MAMMO INCL CAD UNI: CPT | Performed by: RADIOLOGY

## 2019-01-15 PROCEDURE — 77065 DX MAMMO INCL CAD UNI: CPT

## 2019-01-15 NOTE — TELEPHONE ENCOUNTER
----- Message from Kelley Corey sent at 1/15/2019 12:17 PM EST -----  Contact: SHANTELL; HEP A   PT CALLED IN WANTING TO KNOW IF SHE COULD COME IN AND GET THE SECOND HEP A SHOT SHE HAD THE FIRST ONE BACK IN 2013. SHE HAS THE PAPER WORK WITH HER.       CALL BACK ON FILE

## 2019-01-18 ENCOUNTER — CLINICAL SUPPORT (OUTPATIENT)
Dept: FAMILY MEDICINE CLINIC | Facility: CLINIC | Age: 54
End: 2019-01-18

## 2019-01-18 DIAGNOSIS — Z23 NEED FOR HEPATITIS A VACCINATION: Primary | ICD-10-CM

## 2019-01-18 PROCEDURE — 90471 IMMUNIZATION ADMIN: CPT | Performed by: FAMILY MEDICINE

## 2019-01-18 PROCEDURE — 90632 HEPA VACCINE ADULT IM: CPT | Performed by: FAMILY MEDICINE

## 2019-05-03 ENCOUNTER — HOSPITAL ENCOUNTER (OUTPATIENT)
Dept: MAMMOGRAPHY | Facility: HOSPITAL | Age: 54
Discharge: HOME | End: 2019-05-03
Attending: NURSE PRACTITIONER | Admitting: NURSE PRACTITIONER

## 2019-05-03 DIAGNOSIS — Z12.31 VISIT FOR SCREENING MAMMOGRAM: ICD-10-CM

## 2019-05-03 PROCEDURE — 77063 BREAST TOMOSYNTHESIS BI: CPT

## 2019-05-03 PROCEDURE — 77067 SCR MAMMO BI INCL CAD: CPT | Performed by: RADIOLOGY

## 2019-05-03 PROCEDURE — 77067 SCR MAMMO BI INCL CAD: CPT

## 2019-05-03 PROCEDURE — 77063 BREAST TOMOSYNTHESIS BI: CPT | Performed by: RADIOLOGY

## 2019-05-07 ENCOUNTER — TELEPHONE (OUTPATIENT)
Dept: FAMILY MEDICINE CLINIC | Facility: CLINIC | Age: 54
End: 2019-05-07

## 2019-05-07 DIAGNOSIS — Z23 NEED FOR SHINGLES VACCINE: ICD-10-CM

## 2019-05-07 DIAGNOSIS — Z01.84 IMMUNITY TO MEASLES DETERMINED BY SEROLOGIC TEST: Primary | ICD-10-CM

## 2019-05-07 NOTE — TELEPHONE ENCOUNTER
----- Message from Selena Nayak sent at 5/7/2019  4:14 PM EDT -----  Contact: SHANTELL;PT CALLED  PT WANTS TO KNOW IF DR STINSON RECOMMENDS A SHINGLES AND ALSO DOES  SHE NEED A BOOSTER FOR MEASLES - WANTS TO KNOW WHEN HER LAST  MMR WAS AND ALSO A COPY OF THE IMM CERT    [phone]

## 2019-05-07 NOTE — TELEPHONE ENCOUNTER
The only vaccines we have listed are Hep A for 2013 and 2019 and flu vaccine in 2017.  I do not have documentation of other immunizations.  Unless she has previously dropped off and they are in the old system.    She would have to get the shingles vaccine done at pharmacy so I can print prescription if she would like to do that and  the paper prescription.  Let me know.    As far as immunity to measles, the only way to tell for sure is to do a blood test to see if she has immunity to measles and we usually also check rubella and mumps at the same time.  If she would like to do that, please let me know and I will place at lab order.

## 2019-05-08 NOTE — TELEPHONE ENCOUNTER
LVM for patient to return our call, office number was provided.     I checked [employer]scripts to see if any additional immunizations were available. She didn't have any on file there.

## 2019-05-08 NOTE — TELEPHONE ENCOUNTER
Patient stated that she would need a shingles vaccine RX and she would like to do the Immunity test for MMR. She needs this test done at [facility] and would like the order and the RX mailed to her because she cannot make it here on time to pick them up before we close.

## 2019-12-08 ENCOUNTER — TELEPHONE (OUTPATIENT)
Dept: FAMILY MEDICINE CLINIC | Facility: CLINIC | Age: 54
End: 2019-12-08

## 2019-12-08 DIAGNOSIS — E78.00 HYPERCHOLESTEROLEMIA: ICD-10-CM

## 2019-12-09 RX ORDER — ATORVASTATIN CALCIUM 20 MG/1
TABLET, FILM COATED ORAL
Qty: 90 TABLET | Refills: 0 | Status: SHIPPED | OUTPATIENT
Start: 2019-12-09 | End: 2020-03-09

## 2019-12-09 NOTE — TELEPHONE ENCOUNTER
Please call.  I sent in her prescription for her cholesterol medication and she is due for follow-up office visit and blood work.

## 2020-01-28 ENCOUNTER — LAB REQUISITION (OUTPATIENT)
Dept: LAB | Facility: HOSPITAL | Age: 55
End: 2020-01-28

## 2020-01-28 DIAGNOSIS — N90.7 VULVAR CYST: ICD-10-CM

## 2020-01-28 LAB
BACTERIA UR QL AUTO: ABNORMAL /HPF
BILIRUB UR QL STRIP: NEGATIVE
CLARITY UR: ABNORMAL
COLOR UR: YELLOW
GLUCOSE UR STRIP-MCNC: NEGATIVE MG/DL
HGB UR QL STRIP.AUTO: ABNORMAL
HYALINE CASTS UR QL AUTO: ABNORMAL /LPF
KETONES UR QL STRIP: NEGATIVE
LEUKOCYTE ESTERASE UR QL STRIP.AUTO: ABNORMAL
NITRITE UR QL STRIP: NEGATIVE
PH UR STRIP.AUTO: 6.5 [PH] (ref 5–8)
PROT UR QL STRIP: NEGATIVE
RBC # UR: ABNORMAL /HPF
REF LAB TEST METHOD: ABNORMAL
SP GR UR STRIP: 1.02 (ref 1–1.03)
SQUAMOUS #/AREA URNS HPF: ABNORMAL /HPF
UROBILINOGEN UR QL STRIP: ABNORMAL
WBC UR QL AUTO: ABNORMAL /HPF

## 2020-01-28 PROCEDURE — 87088 URINE BACTERIA CULTURE: CPT

## 2020-01-28 PROCEDURE — 87086 URINE CULTURE/COLONY COUNT: CPT

## 2020-01-28 PROCEDURE — 81001 URINALYSIS AUTO W/SCOPE: CPT

## 2020-01-28 PROCEDURE — 87186 SC STD MICRODIL/AGAR DIL: CPT

## 2020-01-28 PROCEDURE — 88305 TISSUE EXAM BY PATHOLOGIST: CPT | Performed by: OBSTETRICS & GYNECOLOGY

## 2020-01-29 LAB
CYTO UR: NORMAL
LAB AP CASE REPORT: NORMAL
LAB AP CLINICAL INFORMATION: NORMAL
LAB AP DIAGNOSIS COMMENT: NORMAL
PATH REPORT.FINAL DX SPEC: NORMAL
PATH REPORT.GROSS SPEC: NORMAL

## 2020-01-30 LAB — BACTERIA SPEC AEROBE CULT: ABNORMAL

## 2020-02-27 ENCOUNTER — TELEPHONE (OUTPATIENT)
Dept: FAMILY MEDICINE CLINIC | Facility: CLINIC | Age: 55
End: 2020-02-27

## 2020-02-27 DIAGNOSIS — Z00.00 WELL ADULT EXAM: ICD-10-CM

## 2020-02-27 DIAGNOSIS — E78.00 HYPERCHOLESTEROLEMIA: Primary | ICD-10-CM

## 2020-02-27 DIAGNOSIS — R73.9 HYPERGLYCEMIA: ICD-10-CM

## 2020-02-27 NOTE — TELEPHONE ENCOUNTER
Pt called and stated that she needs an order sent to [facility] for her yearly blood test to be completed.  Pt asked that she can get it done tomorrow morning, 2/28/20    Pt callback: [phone]    Please advise.

## 2020-02-27 NOTE — TELEPHONE ENCOUNTER
Called informed pt . She stated she would like labs faxed to [facility]. Faxing labs to [phone] lab fax

## 2020-03-07 DIAGNOSIS — E78.00 HYPERCHOLESTEROLEMIA: ICD-10-CM

## 2020-03-09 RX ORDER — ATORVASTATIN CALCIUM 20 MG/1
TABLET, FILM COATED ORAL
Qty: 90 TABLET | Refills: 0 | Status: SHIPPED | OUTPATIENT
Start: 2020-03-09 | End: 2020-05-29 | Stop reason: SDUPTHER

## 2020-03-09 NOTE — TELEPHONE ENCOUNTER
Please call patient.  We received order for refill of atorvastatin.  Did she get her blood work done yet?    In addition, she is due for follow-up office visit.    I have not seen her since December 2018.

## 2020-03-13 ENCOUNTER — TELEPHONE (OUTPATIENT)
Dept: FAMILY MEDICINE CLINIC | Facility: CLINIC | Age: 55
End: 2020-03-13

## 2020-03-13 NOTE — TELEPHONE ENCOUNTER
I suspect it was faxed twice. The orders should be for  A1c, lipid panel, comprehensive metabolic panel and cbc.

## 2020-03-13 NOTE — TELEPHONE ENCOUNTER
Called and informed patient that they were probably sent twice. She verbalized understanding and had no further questions.

## 2020-03-13 NOTE — TELEPHONE ENCOUNTER
PT CALLED STATING THAT SHE WAS PLANNING TO COME 3/14/20 TO THE HOSPITAL TO GET HER LAB WORK DONE.  WHEN THE PT CALLED THERE WERE 6 ORDERS FOR LAB WORK. 3 FROM 2/22/20 AND  3 FROM 2/28/20. SHE ASKED WHICH ORDERS ARE CORRECT.    PT CONTACT [phone]     PLEASE ADVISE

## 2020-03-18 ENCOUNTER — TELEPHONE (OUTPATIENT)
Dept: FAMILY MEDICINE CLINIC | Facility: CLINIC | Age: 55
End: 2020-03-18

## 2020-03-18 NOTE — TELEPHONE ENCOUNTER
Please call, all labs looked good. Was goig to discuss at follow up but I see that she rescheduled.     A1c was 6.1. Chol good. Kidney and liver normal

## 2020-05-29 ENCOUNTER — OFFICE VISIT (OUTPATIENT)
Dept: FAMILY MEDICINE CLINIC | Facility: CLINIC | Age: 55
End: 2020-05-29

## 2020-05-29 VITALS
WEIGHT: 159.2 LBS | BODY MASS INDEX: 27.18 KG/M2 | SYSTOLIC BLOOD PRESSURE: 130 MMHG | TEMPERATURE: 97.3 F | HEIGHT: 64 IN | HEART RATE: 75 BPM | RESPIRATION RATE: 20 BRPM | DIASTOLIC BLOOD PRESSURE: 88 MMHG | OXYGEN SATURATION: 98 %

## 2020-05-29 DIAGNOSIS — E78.00 HYPERCHOLESTEROLEMIA: ICD-10-CM

## 2020-05-29 DIAGNOSIS — R73.9 HYPERGLYCEMIA: ICD-10-CM

## 2020-05-29 DIAGNOSIS — Z00.00 WELL ADULT EXAM: Primary | ICD-10-CM

## 2020-05-29 PROCEDURE — 99396 PREV VISIT EST AGE 40-64: CPT | Performed by: FAMILY MEDICINE

## 2020-05-29 RX ORDER — ATORVASTATIN CALCIUM 20 MG/1
20 TABLET, FILM COATED ORAL DAILY
Qty: 90 TABLET | Refills: 3 | Status: SHIPPED | OUTPATIENT
Start: 2020-05-29 | End: 2021-05-26

## 2020-05-29 NOTE — PROGRESS NOTES
Assessment/Plan       Problems Addressed this Visit        Cardiovascular and Mediastinum    Hypercholesterolemia    Relevant Medications    atorvastatin (LIPITOR) 20 MG tablet      Other Visit Diagnoses     Well adult exam    -  Primary    Hyperglycemia                Follow up: Return in about 1 year (around 5/29/2021).     DISCUSSION  Here for well examination.  Continue routine health maintenance including routine dentistry, eye exam, safety, seatbelt use, exercise and proper nutrition.     Recent blood work showed improvement in A1c of 6.1.  Lipids were stable as well.    Continue all current medications.          MEDICATIONS PRESCRIBED  Requested Prescriptions     Signed Prescriptions Disp Refills   • atorvastatin (LIPITOR) 20 MG tablet 90 tablet 3     Sig: Take 1 tablet by mouth Daily.              -------------------------------------------    Subjective     Chief Complaint   Patient presents with   • Annual Exam     The patient is a 54 y.o. female who presents for well exam.     Nutrition:  eating healthier  Exercise:  3-4 days per week, muscle increased  Sleep:  no issues    Seat Belt: + seat belt    Dentist: Dr Last     Eye exam:  1 yr ago       Advanced Directives:  + living will. Advance Care Planning   ACP discussion was held with the patient during this visit. Patient has an advance directive (not in EMR), copy requested.      Last Colonoscopy:  3/2016, normal     Past Gynecological History:     No LMP recorded. Patient has had a hysterectomy.     Menses: had hyst     Date of last mammogram: 5/2019, normal, [facility] orders    Past Medical History,Medications, Allergies reviewed.     Other concerns/problems:       History of Present Illness    Hyperglycemia  A1c was 6.4 last check.  Has been trying to eat better.  Exercise as well.    Hyperlipidemia  On atorvastatin.  No reported myalgias.  No issues with medication.          Social History     Tobacco Use   Smoking Status Never Smoker  "  Smokeless Tobacco Never Used          Past Medical History,Medications, Allergies, and social history was reviewed.          Review of Systems   Constitutional: Negative.    HENT: Negative.         Spring allergies   Respiratory: Negative.  Negative for shortness of breath.    Cardiovascular: Negative.  Negative for chest pain.   Gastrointestinal: Negative.    Genitourinary: Negative.    Musculoskeletal: Negative.  Negative for arthralgias and back pain.        Joints have improved   Neurological: Negative.    Psychiatric/Behavioral: Negative.  Negative for depressed mood. The patient is not nervous/anxious.        Objective     Vitals:    05/29/20 1151   BP: 130/88   Pulse: 75   Resp: 20   Temp: 97.3 °F (36.3 °C)   TempSrc: Temporal   SpO2: 98%   Weight: 72.2 kg (159 lb 3.2 oz)   Height: 163.2 cm (64.25\")          Physical Exam   Constitutional: She appears well-developed and well-nourished.   HENT:   Head: Normocephalic and atraumatic.   Right Ear: Hearing, tympanic membrane, external ear and ear canal normal.   Left Ear: Hearing, tympanic membrane, external ear and ear canal normal.   Mouth/Throat: Oropharynx is clear and moist.   Eyes: Pupils are equal, round, and reactive to light. Conjunctivae and EOM are normal.   Neck: Normal range of motion. Neck supple. No thyromegaly present.   Cardiovascular: Normal rate, regular rhythm and normal heart sounds. Exam reveals no gallop and no friction rub.   No murmur heard.  Pulmonary/Chest: Effort normal and breath sounds normal. No respiratory distress. She has no wheezes. She has no rales.   Abdominal: Soft. Bowel sounds are normal. She exhibits no distension. There is no tenderness. There is no rebound and no guarding.   Musculoskeletal: She exhibits no edema.        Right shoulder: Normal.        Left shoulder: Normal.        Right hip: Normal.        Left hip: Normal.   Neurological: She is alert. She displays no tremor. No cranial nerve deficit. She exhibits " normal muscle tone. Gait normal.   Skin: Skin is warm and dry.   Psychiatric: She has a normal mood and affect.   Nursing note and vitals reviewed.                Camilo Garcia MD

## 2020-06-26 ENCOUNTER — TRANSCRIBE ORDERS (OUTPATIENT)
Dept: ADMINISTRATIVE | Facility: HOSPITAL | Age: 55
End: 2020-06-26

## 2020-06-26 DIAGNOSIS — Z12.31 VISIT FOR SCREENING MAMMOGRAM: Primary | ICD-10-CM

## 2020-09-04 ENCOUNTER — APPOINTMENT (OUTPATIENT)
Dept: MAMMOGRAPHY | Facility: HOSPITAL | Age: 55
End: 2020-09-04

## 2020-11-03 ENCOUNTER — HOSPITAL ENCOUNTER (OUTPATIENT)
Dept: MAMMOGRAPHY | Facility: HOSPITAL | Age: 55
Discharge: HOME | End: 2020-11-03
Admitting: NURSE PRACTITIONER

## 2020-11-03 DIAGNOSIS — Z12.31 VISIT FOR SCREENING MAMMOGRAM: ICD-10-CM

## 2020-11-03 PROCEDURE — 77067 SCR MAMMO BI INCL CAD: CPT | Performed by: RADIOLOGY

## 2020-11-03 PROCEDURE — 77063 BREAST TOMOSYNTHESIS BI: CPT

## 2020-11-03 PROCEDURE — 77067 SCR MAMMO BI INCL CAD: CPT

## 2020-11-03 PROCEDURE — 77063 BREAST TOMOSYNTHESIS BI: CPT | Performed by: RADIOLOGY

## 2021-05-26 ENCOUNTER — TELEPHONE (OUTPATIENT)
Dept: FAMILY MEDICINE CLINIC | Facility: CLINIC | Age: 56
End: 2021-05-26

## 2021-05-26 DIAGNOSIS — E78.00 HYPERCHOLESTEROLEMIA: ICD-10-CM

## 2021-05-26 RX ORDER — ATORVASTATIN CALCIUM 20 MG/1
TABLET, FILM COATED ORAL
Qty: 90 TABLET | Refills: 0 | Status: SHIPPED | OUTPATIENT
Start: 2021-05-26 | End: 2021-08-18

## 2021-05-26 NOTE — TELEPHONE ENCOUNTER
Please call.  I refilled her cholesterol medication once.  She is due for follow-up office visit and blood work.

## 2021-08-10 ENCOUNTER — TELEPHONE (OUTPATIENT)
Dept: FAMILY MEDICINE CLINIC | Facility: CLINIC | Age: 56
End: 2021-08-10

## 2021-08-10 DIAGNOSIS — R73.9 HYPERGLYCEMIA: ICD-10-CM

## 2021-08-10 DIAGNOSIS — E78.00 HYPERCHOLESTEROLEMIA: Primary | ICD-10-CM

## 2021-08-10 NOTE — TELEPHONE ENCOUNTER
Caller: Tiffany Han    Relationship: Self    Best call back number:  [phone]    What orders are you requesting (i.e. lab or imaging): BLOOD WORK     In what timeframe would the patient need to come in:     Where will you receive your lab/imaging services:     Additional notes:  PATIENT IS REQUESTING THE ORDER BE MAILED TO HER     TIFFANY HAN   [address] [place] [id number]

## 2021-08-18 DIAGNOSIS — E78.00 HYPERCHOLESTEROLEMIA: ICD-10-CM

## 2021-08-18 RX ORDER — ATORVASTATIN CALCIUM 20 MG/1
TABLET, FILM COATED ORAL
Qty: 90 TABLET | Refills: 0 | Status: SHIPPED | OUTPATIENT
Start: 2021-08-18 | End: 2021-11-17

## 2021-09-17 ENCOUNTER — OFFICE VISIT (OUTPATIENT)
Dept: FAMILY MEDICINE CLINIC | Facility: CLINIC | Age: 56
End: 2021-09-17

## 2021-09-17 VITALS
WEIGHT: 160 LBS | RESPIRATION RATE: 20 BRPM | OXYGEN SATURATION: 98 % | SYSTOLIC BLOOD PRESSURE: 162 MMHG | HEART RATE: 67 BPM | DIASTOLIC BLOOD PRESSURE: 98 MMHG | TEMPERATURE: 99.3 F | HEIGHT: 64 IN | BODY MASS INDEX: 27.31 KG/M2

## 2021-09-17 DIAGNOSIS — Z00.00 WELL ADULT EXAM: Primary | ICD-10-CM

## 2021-09-17 DIAGNOSIS — E78.00 HYPERCHOLESTEROLEMIA: ICD-10-CM

## 2021-09-17 DIAGNOSIS — R73.9 HYPERGLYCEMIA: ICD-10-CM

## 2021-09-17 DIAGNOSIS — R03.0 ELEVATED BLOOD PRESSURE READING: ICD-10-CM

## 2021-09-17 PROCEDURE — 99396 PREV VISIT EST AGE 40-64: CPT | Performed by: FAMILY MEDICINE

## 2021-09-17 NOTE — PROGRESS NOTES
"Chief Complaint  Annual Exam (No PAP, see's GYN )    Subjective          Aysha Reaves presents to [facility]  History of Present Illness    The patient is a 56 y.o. female who comes in to the office today for well exam.        Nutrition:  has been eating healthy  Exercise:  + exercise  Sleep:  doing well     Dentist: Dr Last  Skin Check with Derm  Eye Exam: new glasses    Seat Belt:  + seat belt    GYN 2021    Mammogram 11/2020    Colonoscopy: 2016    Other concerns/problems:    Bilateral knee pain   Had meniscal tear and repair 5 yrs ago  Last week, did too much and was limping  Iced it and ibuprofen  Had been doing a lot of steps  Getting a little better.   No fall        Tobacco Use: Low Risk    • Smoking Tobacco Use: Never Smoker   • Smokeless Tobacco Use: Never Used       One brother + HTN    No cold meds  Ibuprofen 2 tablets in evening  And 1-2 in the am       Review of Systems   Constitutional: Negative.  Negative for fatigue.   HENT: Negative.    Respiratory: Negative.  Negative for shortness of breath.    Cardiovascular: Negative.  Negative for chest pain.   Gastrointestinal: Negative.    Genitourinary: Negative.         Up at hs once   Musculoskeletal: Positive for arthralgias (knee ).   Neurological: Negative for dizziness, syncope and headache.   Psychiatric/Behavioral: Negative.         Objective       Vital Signs:   /98   Pulse 67   Temp 99.3 °F (37.4 °C)   Resp 20   Ht 163.2 cm (64.25\")   Wt 72.6 kg (160 lb)   SpO2 98%   BMI 27.25 kg/m²     Physical Exam  Vitals and nursing note reviewed.   Constitutional:       General: She is not in acute distress.     Appearance: She is well-developed. She is not ill-appearing.   HENT:      Head: Normocephalic and atraumatic.      Right Ear: Hearing, tympanic membrane, ear canal and external ear normal.      Left Ear: Hearing, tympanic membrane, ear canal and external ear normal.      Nose: Nose normal. No " congestion or rhinorrhea.      Mouth/Throat:      Mouth: Mucous membranes are moist.      Pharynx: No oropharyngeal exudate or posterior oropharyngeal erythema.   Eyes:      General:         Right eye: No discharge.         Left eye: No discharge.      Conjunctiva/sclera: Conjunctivae normal.      Pupils: Pupils are equal, round, and reactive to light.   Neck:      Thyroid: No thyromegaly.   Cardiovascular:      Rate and Rhythm: Normal rate and regular rhythm.      Heart sounds: Normal heart sounds. No murmur heard.   No friction rub. No gallop.    Pulmonary:      Effort: Pulmonary effort is normal. No respiratory distress.      Breath sounds: Normal breath sounds. No wheezing or rales.   Abdominal:      General: Bowel sounds are normal. There is no distension.      Palpations: Abdomen is soft. There is no mass.      Tenderness: There is no abdominal tenderness. There is no guarding or rebound.   Musculoskeletal:      Cervical back: Normal range of motion and neck supple.      Right lower leg: No edema.      Left lower leg: No edema.   Lymphadenopathy:      Cervical: No cervical adenopathy.   Skin:     General: Skin is warm and dry.      Coloration: Skin is not jaundiced or pale.   Neurological:      General: No focal deficit present.      Mental Status: She is alert.   Psychiatric:         Mood and Affect: Mood normal.         Behavior: Behavior normal.          Result Review :                     Assessment and Plan    Diagnoses and all orders for this visit:    1. Well adult exam (Primary)    2. Hypercholesterolemia    3. Hyperglycemia  -     Hemoglobin A1c    4. Elevated blood pressure reading          DISCUSSION    Here for well examination.  Continue routine health maintenance including routine dentistry, eye exam, safety, seatbelt use, exercise and proper nutrition.    Elevated blood pressure.  Recommend check blood pressure over the weekend.  Call with readings on Monday.  Rec hold ibuprofen over the weekend  and monitor BP.     Previous hyperglycemia.  Check A1c        Follow Up   Return for follow up depends on review of labs and testing.    Patient was given instructions and counseling regarding her condition or for health maintenance advice. Please see specific information pulled into the AVS if appropriate.       Camilo Garcia MD

## 2021-09-18 LAB — HBA1C MFR BLD: 6.3 % (ref 4.8–5.6)

## 2021-09-21 ENCOUNTER — TELEPHONE (OUTPATIENT)
Dept: FAMILY MEDICINE CLINIC | Facility: CLINIC | Age: 56
End: 2021-09-21

## 2021-09-21 NOTE — TELEPHONE ENCOUNTER
Caller: Aysha Reaves    Relationship: Self    Best call back number: [phone] (H)    Caller requesting test results: YES    What test was performed: A1C    When was the test performed: 09/17/21    Where was the test performed: WITH IN [facility]

## 2021-09-21 NOTE — TELEPHONE ENCOUNTER
Please call, looks like last 3 blood pressure readings are better. Rec check 2-3 times per week and call if staying above [phone] top number or 90 bottom number .

## 2021-09-21 NOTE — TELEPHONE ENCOUNTER
Caller: Aysha Reaves    Relationship: Self    Best call back number: [phone]        09/18/21-8:00 /86 HR 60 8:00 /76 HR 76    09/20/21-5:45 /86 HR 62 12:00 /88 HR 78 8:00 /73 HR 66    09/21/21-6:00AM 132/81 HR 53 4:30 135/84 HR 63

## 2021-10-15 ENCOUNTER — TELEPHONE (OUTPATIENT)
Dept: FAMILY MEDICINE CLINIC | Facility: CLINIC | Age: 56
End: 2021-10-15

## 2021-10-15 NOTE — TELEPHONE ENCOUNTER
Caller: Aysha Reaves    Relationship: Self    Best call back number: [phone] (H)    What medication are you requesting:   BLOOD PRESSURE  GLUCOSE LEVEL    What are your current symptoms:   ELEVATED BLOOD PRESSURE    How long have you been experiencing symptoms:   PAST YEAR OR LONGER    Have you had these symptoms before:    [x] Yes  [] No    Have you been treated for these symptoms before:   [] Yes  [x] No    If a prescription is needed, what is your preferred pharmacy and phone number:    [id number] - [place], [place] - [address] [place] [address] AT [facility] - [phone]  - [phone] FX    Additional notes:  PATIENT STATES THAT HER BLOOD PRESSURE HAS BEEN A CONCERN FOR A WHILE NOW AND SHE WILL LIKE TO TAKE THE NECESSARY STEPS TO GETTING IT BACK REGULATED. PATIENT STATES THAT SHE IS ALSO CONCERNED ABOUT HER GLUCOSE LEVEL AS THIS HAS BEEN PREVIOUSLY DISCUSSED DURING OFFICE VISITS.     PATIENT HAS BEEN ADVISED TO PLEASE ALLOW 48 HOURS FOR OUR CLINICAL TEAM WILL FOLLOW UP ON THIS REQUEST.

## 2021-10-15 NOTE — TELEPHONE ENCOUNTER
Please call.  What has her blood pressure been running lately.  We may need to start her on some blood pressure medication.      In addition, her diabetes test was 6.3 which is similar to previous levels.  Have been 6.1.  Need to continue to work on decreasing sweets and carbohydrates in diet.

## 2021-10-18 DIAGNOSIS — I10 PRIMARY HYPERTENSION: Primary | ICD-10-CM

## 2021-10-18 RX ORDER — LISINOPRIL 20 MG/1
20 TABLET ORAL DAILY
Qty: 30 TABLET | Refills: 0 | Status: SHIPPED | OUTPATIENT
Start: 2021-10-18 | End: 2021-11-11

## 2021-10-18 NOTE — TELEPHONE ENCOUNTER
Spoke with patient. Informed her of results. Stated her bp has been around 148/83, 158/88. She has been having headaches. Would like to know what she should do til kira comes back.   Call after 330 or leave a detailed message.   [facility]

## 2021-10-18 NOTE — TELEPHONE ENCOUNTER
Will send her in an Rx for lisinopril 20 mg to take once daily, this is a common starting BP medication we try to get values under better control. Continue to watch sodium intake and eat well balanced nutrition. Can take a few days before she will start noticing a drop in values. Encourage she come into office in 2 weeks for nurse BP check and follow with PCP in 4 weeks. JD

## 2021-11-05 ENCOUNTER — TELEPHONE (OUTPATIENT)
Dept: FAMILY MEDICINE CLINIC | Facility: CLINIC | Age: 56
End: 2021-11-05

## 2021-11-11 DIAGNOSIS — I10 PRIMARY HYPERTENSION: ICD-10-CM

## 2021-11-11 RX ORDER — LISINOPRIL 20 MG/1
TABLET ORAL
Qty: 30 TABLET | Refills: 0 | Status: SHIPPED | OUTPATIENT
Start: 2021-11-11 | End: 2021-12-07

## 2021-11-14 DIAGNOSIS — E78.00 HYPERCHOLESTEROLEMIA: ICD-10-CM

## 2021-11-17 RX ORDER — ATORVASTATIN CALCIUM 20 MG/1
TABLET, FILM COATED ORAL
Qty: 90 TABLET | Refills: 0 | Status: SHIPPED | OUTPATIENT
Start: 2021-11-17 | End: 2022-02-15

## 2021-11-18 ENCOUNTER — OFFICE VISIT (OUTPATIENT)
Dept: FAMILY MEDICINE CLINIC | Facility: CLINIC | Age: 56
End: 2021-11-18

## 2021-11-18 VITALS
HEART RATE: 70 BPM | SYSTOLIC BLOOD PRESSURE: 142 MMHG | DIASTOLIC BLOOD PRESSURE: 84 MMHG | BODY MASS INDEX: 26.8 KG/M2 | RESPIRATION RATE: 18 BRPM | TEMPERATURE: 97.5 F | HEIGHT: 64 IN | WEIGHT: 157 LBS

## 2021-11-18 DIAGNOSIS — E78.00 HYPERCHOLESTEROLEMIA: ICD-10-CM

## 2021-11-18 DIAGNOSIS — I10 PRIMARY HYPERTENSION: Primary | ICD-10-CM

## 2021-11-18 DIAGNOSIS — R73.9 HYPERGLYCEMIA: ICD-10-CM

## 2021-11-18 PROCEDURE — 99213 OFFICE O/P EST LOW 20 MIN: CPT | Performed by: FAMILY MEDICINE

## 2021-11-18 NOTE — PROGRESS NOTES
"Chief Complaint  Hypertension    Subjective          Aysha Reaves presents to [facility]  History of Present Illness     The patient consents to being recorded using CHELSEY.    Hypertension  She is currently on lisinopril 20 mg daily. The patient is feeling well. She reports that she does have a mild cough; however, she cannot definitively say this is a side effect of lisinopril. She reports that her recent elevated blood pressure reading she was informed she had a bladder infection at that time. The patient notes when she was monitoring her blood pressure she notes she would become anxious and she feels this was attributing to her elevated readings; therefore, she no longer monitors her blood pressure at home. The patient denies edema to her lower extremities.    Hypercholesterolemia  She is also on Lipitor 20 mg daily for hyperlipidemia. The patient is feeling well. She reports she has taken medication since 2001. The patient denies myalgias.     Hyperglycemia  She reports that she consumes black coffee and has eliminated consuming Coke.     Immunizations  She was received her COVID-19 vaccine including the booster dose. The patient adds she received the booster dose on 11/12/2021. She denies side effects. She  notes she has also received her influenza vaccine in 09/2021.    Preventative health  She denies problems with her bowel movements and states \"everything works better than it should\".     History of bladder infections  The patient reports she now sees a urologist regularly due to having multiple bladder infections more frequently in the past year.       Review of Systems   Constitutional: Negative.    HENT: Negative.    Respiratory: Negative.    Cardiovascular: Negative.    Gastrointestinal: Negative.    Genitourinary: Negative.    Neurological: Negative.    Psychiatric/Behavioral: Negative.         Objective       Vital Signs:   /84   Pulse 70   Temp 97.5 °F (36.4 " "°C)   Resp 18   Ht 163.2 cm (64.25\")   Wt 71.2 kg (157 lb)   BMI 26.74 kg/m²     Physical Exam  Vitals and nursing note reviewed.   Constitutional:       Appearance: She is well-developed.   HENT:      Head: Normocephalic and atraumatic.      Right Ear: Hearing normal.      Left Ear: Hearing normal.   Eyes:      Conjunctiva/sclera: Conjunctivae normal.      Pupils: Pupils are equal, round, and reactive to light.   Cardiovascular:      Rate and Rhythm: Normal rate and regular rhythm.      Heart sounds: Normal heart sounds. No murmur heard.  No friction rub.   Pulmonary:      Effort: Pulmonary effort is normal. No respiratory distress.      Breath sounds: Normal breath sounds. No wheezing or rales.   Skin:     General: Skin is warm.   Neurological:      Mental Status: She is alert and oriented to person, place, and time.   Psychiatric:         Behavior: Behavior normal.          Result Review :                     Assessment and Plan    Diagnoses and all orders for this visit:    1. Primary hypertension (Primary)  -     Comprehensive Metabolic Panel; Future    2. Hypercholesterolemia  -     Comprehensive Metabolic Panel; Future  -     Lipid Panel With / Chol / HDL Ratio; Future    3. Hyperglycemia  -     Hemoglobin A1c; Future      1. Hypertension  - She will continue with lisinopril and atorvastatin as prescribed. No changes are made today.     2. Hypercholesterolemia  - Blood work orders are entered for her to have drawn in approximately 3-months.    3. Hyperglycemia  - Blood work orders are entered for her to have drawn in approximately 3-months.  She will continue to monitor her diet and decreasing carbohydrates and sweets.        DISCUSSION        Follow Up   Return in about 3 months (around 2/18/2022).    Patient was given instructions and counseling regarding her condition or for health maintenance advice. Please see specific information pulled into the AVS if appropriate.       Camilo Garcia MD "     Transcribed from ambient dictation for Camilo Garcia MD by Ling Dan.  11/18/21   20:36 EST    Patient verbalized consent to the visit recording.  I have personally performed the services described in this document as transcribed by the above individual, and it is both accurate and complete.  Camilo Garcia MD  11/19/2021  08:29 EST

## 2021-12-07 DIAGNOSIS — I10 PRIMARY HYPERTENSION: ICD-10-CM

## 2021-12-07 RX ORDER — LISINOPRIL 20 MG/1
TABLET ORAL
Qty: 30 TABLET | Refills: 5 | Status: SHIPPED | OUTPATIENT
Start: 2021-12-07 | End: 2022-05-20 | Stop reason: SDUPTHER

## 2021-12-07 NOTE — TELEPHONE ENCOUNTER
Last Office Visit: 11/18/2021  Next Office Visit: none         Please review pended refill request for any changes needed on refills or quantities. Thank you!

## 2022-01-03 ENCOUNTER — TRANSCRIBE ORDERS (OUTPATIENT)
Dept: ADMINISTRATIVE | Facility: HOSPITAL | Age: 57
End: 2022-01-03

## 2022-01-03 DIAGNOSIS — Z12.31 VISIT FOR SCREENING MAMMOGRAM: Primary | ICD-10-CM

## 2022-01-07 ENCOUNTER — TELEPHONE (OUTPATIENT)
Dept: FAMILY MEDICINE CLINIC | Facility: CLINIC | Age: 57
End: 2022-01-07

## 2022-01-07 DIAGNOSIS — E78.00 HYPERCHOLESTEROLEMIA: ICD-10-CM

## 2022-01-07 DIAGNOSIS — I10 PRIMARY HYPERTENSION: Primary | ICD-10-CM

## 2022-01-07 DIAGNOSIS — R73.9 HYPERGLYCEMIA: ICD-10-CM

## 2022-01-07 NOTE — TELEPHONE ENCOUNTER
Caller: Aysha Reaves    Relationship: Self    Best call back number: [phone]     What orders are you requesting (i.e. lab or imaging): LABS    In what timeframe would the patient need to come in: THE WEEK BEFORE HER APPOINTMENT 2-21-22    Where will you receive your lab/imaging services: PATIENT WANTS THE ORDERS MAILED.    Additional notes:

## 2022-01-11 NOTE — TELEPHONE ENCOUNTER
Orders have been printed and placed in January's tray. Please mail to her. Please let her know that I did order the A1c again and make sure that the lab at [facility] does do that for her when she goes in. There are 4 labs these include CBC, CMP, lipid panel and the A1c.

## 2022-02-10 ENCOUNTER — TELEPHONE (OUTPATIENT)
Dept: FAMILY MEDICINE CLINIC | Facility: CLINIC | Age: 57
End: 2022-02-10

## 2022-02-10 NOTE — TELEPHONE ENCOUNTER
PATIENT WOULD LIKE COPY OF IMMUNIZATION RECORD.  SHE WILL  WHEN READY.     PLEASE CALL [phone]

## 2022-02-15 DIAGNOSIS — E78.00 HYPERCHOLESTEROLEMIA: ICD-10-CM

## 2022-02-15 RX ORDER — ATORVASTATIN CALCIUM 20 MG/1
TABLET, FILM COATED ORAL
Qty: 90 TABLET | Refills: 0 | Status: SHIPPED | OUTPATIENT
Start: 2022-02-15 | End: 2022-05-20 | Stop reason: SDUPTHER

## 2022-02-23 ENCOUNTER — TELEPHONE (OUTPATIENT)
Dept: FAMILY MEDICINE CLINIC | Facility: CLINIC | Age: 57
End: 2022-02-23

## 2022-02-23 ENCOUNTER — APPOINTMENT (OUTPATIENT)
Dept: MAMMOGRAPHY | Facility: HOSPITAL | Age: 57
End: 2022-02-23

## 2022-02-23 NOTE — TELEPHONE ENCOUNTER
PT DROPPED OFF PAPERWORK OF UPDATED SHOT RECORDS, PUT HAD A AUDRA FOR TOMORROW WITH KEMAL SO SHE LEFT THEM FOR US TO LOOK OVER SO THEY CAN TALK ABOUT IN HER AUDRA TOMORROW.

## 2022-02-24 ENCOUNTER — OFFICE VISIT (OUTPATIENT)
Dept: FAMILY MEDICINE CLINIC | Facility: CLINIC | Age: 57
End: 2022-02-24

## 2022-02-24 VITALS
HEIGHT: 64 IN | BODY MASS INDEX: 26.77 KG/M2 | OXYGEN SATURATION: 99 % | DIASTOLIC BLOOD PRESSURE: 86 MMHG | SYSTOLIC BLOOD PRESSURE: 146 MMHG | TEMPERATURE: 97.4 F | RESPIRATION RATE: 18 BRPM | HEART RATE: 62 BPM | WEIGHT: 156.8 LBS

## 2022-02-24 DIAGNOSIS — E78.2 MIXED HYPERLIPIDEMIA: ICD-10-CM

## 2022-02-24 DIAGNOSIS — I10 ESSENTIAL (PRIMARY) HYPERTENSION: Primary | ICD-10-CM

## 2022-02-24 DIAGNOSIS — Z23 NEED FOR MMR VACCINE: ICD-10-CM

## 2022-02-24 DIAGNOSIS — R73.03 PREDIABETES: ICD-10-CM

## 2022-02-24 PROCEDURE — 99213 OFFICE O/P EST LOW 20 MIN: CPT | Performed by: PHYSICIAN ASSISTANT

## 2022-02-24 PROCEDURE — 90471 IMMUNIZATION ADMIN: CPT | Performed by: PHYSICIAN ASSISTANT

## 2022-02-24 PROCEDURE — 90707 MMR VACCINE SC: CPT | Performed by: PHYSICIAN ASSISTANT

## 2022-02-24 NOTE — PROGRESS NOTES
"Subjective   Aysha Reaves is a 56 y.o. female.     History of Present Illness   Pt presents requesting updated MMR vaccination. 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   Following vaccination, patient would like updated immunization certificate.     Hep A 8/1/13 1/18/19    Shingrix: 5/23/19 9/20/19    Hep B vaccination: 10/12/10, 12/1/10, 5/9/11    TB interferon gold: 2/7/22    Tdap in 2013- due 8/2013     +varicella titer     Flu shot UTD     Blood sugar has been within prediabetic range and has long hx of hyperlipidemia that has been treated with statin   Started on lisinopril recently for BP management. Still up in office today. Has not been checking regularly at home       The following portions of the patient's history were reviewed and updated as appropriate: allergies, current medications, past family history, past medical history, past social history, past surgical history and problem list.    Review of Systems   Constitutional: Negative for chills and fever.   Respiratory: Negative for cough, shortness of breath and wheezing.    Cardiovascular: Negative for chest pain.   Gastrointestinal: Negative for diarrhea, nausea and vomiting.       Objective    Blood pressure 146/86, pulse 62, temperature 97.4 °F (36.3 °C), resp. rate 18, height 163.2 cm (64.25\"), weight 71.1 kg (156 lb 12.8 oz), SpO2 99 %.     Physical Exam  Vitals and nursing note reviewed.   Constitutional:       Appearance: Normal appearance.   HENT:      Nose: Nose normal.   Eyes:      Conjunctiva/sclera: Conjunctivae normal.   Cardiovascular:      Rate and Rhythm: Normal rate and regular rhythm.      Heart sounds: Normal heart sounds.   Pulmonary:      Effort: Pulmonary effort is normal. No respiratory distress.      Breath sounds: Normal breath sounds. No wheezing or rhonchi.   Skin:     General: Skin is warm and dry.   Neurological:      Mental Status: She is alert and oriented to person, place, and " "time.   Psychiatric:         Mood and Affect: Mood normal.         Behavior: Behavior normal.         Thought Content: Thought content normal.         Judgment: Judgment normal.         Assessment/Plan   Diagnoses and all orders for this visit:    1. Essential (primary) hypertension (Primary)  -     CBC w AUTO Differential  -     Comprehensive metabolic panel    2. Need for MMR vaccine  -     MMR Vaccine Subcutaneous    3. Mixed hyperlipidemia  -     Lipid Panel    4. Prediabetes  -     Hemoglobin A1c    future labs followed by 4 month office visit   Monitor BP BID X 1 week and follow up with office with values. May need to adjust BP medication if control is not looking better at home.   Recommended the book \"Life in the Fasting Geo\" to help with insulin and A1c control.   MMR administered today with patient's consent. F/u for second dose in 4 weeks.            "

## 2022-03-04 DIAGNOSIS — E78.00 HYPERCHOLESTEROLEMIA: ICD-10-CM

## 2022-03-04 RX ORDER — ATORVASTATIN CALCIUM 20 MG/1
TABLET, FILM COATED ORAL
Qty: 90 TABLET | Refills: 0 | OUTPATIENT
Start: 2022-03-04

## 2022-03-15 ENCOUNTER — TELEPHONE (OUTPATIENT)
Dept: FAMILY MEDICINE CLINIC | Facility: CLINIC | Age: 57
End: 2022-03-15

## 2022-03-15 NOTE — TELEPHONE ENCOUNTER
Feb 24 had first mmr done needed to come back for the 2nd shot due march 24 needed order to be placed and call for when she can comes in

## 2022-03-15 NOTE — TELEPHONE ENCOUNTER
Please call, 2nd dose is needed if college student, healthcare workers, international travelers, household or close contacts to immunocompromised patients or HIV infection. Does she fit in any of those criteria?  If not , would not need 2nd dose. If does, then ok to get after 28 days and order can be placed when she comes in .

## 2022-03-16 NOTE — TELEPHONE ENCOUNTER
Yes, pt is a medical student, volunteer at [employer], health care worker and international traveler. She'll be in 28d from first dose for second one.

## 2022-03-25 ENCOUNTER — TELEPHONE (OUTPATIENT)
Dept: FAMILY MEDICINE CLINIC | Facility: CLINIC | Age: 57
End: 2022-03-25

## 2022-03-25 ENCOUNTER — CLINICAL SUPPORT (OUTPATIENT)
Dept: FAMILY MEDICINE CLINIC | Facility: CLINIC | Age: 57
End: 2022-03-25

## 2022-03-25 DIAGNOSIS — Z23 NEED FOR MMR VACCINE: Primary | ICD-10-CM

## 2022-03-25 PROCEDURE — 90471 IMMUNIZATION ADMIN: CPT | Performed by: PHYSICIAN ASSISTANT

## 2022-03-25 PROCEDURE — 90707 MMR VACCINE SC: CPT | Performed by: PHYSICIAN ASSISTANT

## 2022-03-25 NOTE — TELEPHONE ENCOUNTER
Please call.  I received her blood pressure readings as she dropped off.  They all look good.  Would not change any medication at this time.  Follow-up as scheduled in May.  If she has any questions, please let me know.

## 2022-03-28 NOTE — TELEPHONE ENCOUNTER
SPOKE WITH PATIENT SHE DOES NOT HAVE ANY QUESTIONS AND SOUNDS GOOD TO HER.  SHE WILL SEE US IN MAY

## 2022-04-08 ENCOUNTER — HOSPITAL ENCOUNTER (OUTPATIENT)
Dept: MAMMOGRAPHY | Facility: HOSPITAL | Age: 57
Discharge: HOME | End: 2022-04-08

## 2022-04-08 DIAGNOSIS — Z12.31 VISIT FOR SCREENING MAMMOGRAM: ICD-10-CM

## 2022-05-13 ENCOUNTER — HOSPITAL ENCOUNTER (OUTPATIENT)
Dept: MAMMOGRAPHY | Facility: HOSPITAL | Age: 57
Discharge: HOME | End: 2022-05-13
Admitting: NURSE PRACTITIONER

## 2022-05-13 PROCEDURE — 77063 BREAST TOMOSYNTHESIS BI: CPT | Performed by: RADIOLOGY

## 2022-05-13 PROCEDURE — 77063 BREAST TOMOSYNTHESIS BI: CPT

## 2022-05-13 PROCEDURE — 77067 SCR MAMMO BI INCL CAD: CPT

## 2022-05-13 PROCEDURE — 77067 SCR MAMMO BI INCL CAD: CPT | Performed by: RADIOLOGY

## 2022-05-14 LAB
ALBUMIN SERPL-MCNC: 4.5 G/DL (ref 3.5–5.2)
ALBUMIN/GLOB SERPL: 1.6 G/DL
ALP SERPL-CCNC: 96 U/L (ref 39–117)
ALT SERPL-CCNC: 27 U/L (ref 1–33)
AST SERPL-CCNC: 19 U/L (ref 1–32)
BASOPHILS # BLD AUTO: 0.05 10*3/MM3 (ref 0–0.2)
BASOPHILS NFR BLD AUTO: 0.8 % (ref 0–1.5)
BILIRUB SERPL-MCNC: 0.6 MG/DL (ref 0–1.2)
BUN SERPL-MCNC: 15 MG/DL (ref 6–20)
BUN/CREAT SERPL: 17.6 (ref 7–25)
CALCIUM SERPL-MCNC: 10.4 MG/DL (ref 8.6–10.5)
CHLORIDE SERPL-SCNC: 100 MMOL/L (ref 98–107)
CHOLEST SERPL-MCNC: 168 MG/DL (ref 0–200)
CO2 SERPL-SCNC: 30.1 MMOL/L (ref 22–29)
CREAT SERPL-MCNC: 0.85 MG/DL (ref 0.57–1)
EGFRCR SERPLBLD CKD-EPI 2021: 80 ML/MIN/1.73
EOSINOPHIL # BLD AUTO: 0.08 10*3/MM3 (ref 0–0.4)
EOSINOPHIL NFR BLD AUTO: 1.3 % (ref 0.3–6.2)
ERYTHROCYTE [DISTWIDTH] IN BLOOD BY AUTOMATED COUNT: 12.8 % (ref 12.3–15.4)
GLOBULIN SER CALC-MCNC: 2.8 GM/DL
GLUCOSE SERPL-MCNC: 91 MG/DL (ref 65–99)
HBA1C MFR BLD: 6.2 % (ref 4.8–5.6)
HCT VFR BLD AUTO: 44.1 % (ref 34–46.6)
HDLC SERPL-MCNC: 50 MG/DL (ref 40–60)
HGB BLD-MCNC: 15.1 G/DL (ref 12–15.9)
IMM GRANULOCYTES # BLD AUTO: 0.02 10*3/MM3 (ref 0–0.05)
IMM GRANULOCYTES NFR BLD AUTO: 0.3 % (ref 0–0.5)
LDLC SERPL CALC-MCNC: 103 MG/DL (ref 0–100)
LYMPHOCYTES # BLD AUTO: 2.11 10*3/MM3 (ref 0.7–3.1)
LYMPHOCYTES NFR BLD AUTO: 34.5 % (ref 19.6–45.3)
MCH RBC QN AUTO: 30.8 PG (ref 26.6–33)
MCHC RBC AUTO-ENTMCNC: 34.2 G/DL (ref 31.5–35.7)
MCV RBC AUTO: 89.8 FL (ref 79–97)
MONOCYTES # BLD AUTO: 0.54 10*3/MM3 (ref 0.1–0.9)
MONOCYTES NFR BLD AUTO: 8.8 % (ref 5–12)
NEUTROPHILS # BLD AUTO: 3.31 10*3/MM3 (ref 1.7–7)
NEUTROPHILS NFR BLD AUTO: 54.3 % (ref 42.7–76)
NRBC BLD AUTO-RTO: 0 /100 WBC (ref 0–0.2)
PLATELET # BLD AUTO: 277 10*3/MM3 (ref 140–450)
POTASSIUM SERPL-SCNC: 5 MMOL/L (ref 3.5–5.2)
PROT SERPL-MCNC: 7.3 G/DL (ref 6–8.5)
RBC # BLD AUTO: 4.91 10*6/MM3 (ref 3.77–5.28)
SODIUM SERPL-SCNC: 141 MMOL/L (ref 136–145)
TRIGL SERPL-MCNC: 78 MG/DL (ref 0–150)
VLDLC SERPL CALC-MCNC: 15 MG/DL (ref 5–40)
WBC # BLD AUTO: 6.11 10*3/MM3 (ref 3.4–10.8)

## 2022-05-20 ENCOUNTER — OFFICE VISIT (OUTPATIENT)
Dept: FAMILY MEDICINE CLINIC | Facility: CLINIC | Age: 57
End: 2022-05-20

## 2022-05-20 VITALS
BODY MASS INDEX: 26.8 KG/M2 | HEART RATE: 72 BPM | TEMPERATURE: 98.1 F | WEIGHT: 157 LBS | SYSTOLIC BLOOD PRESSURE: 132 MMHG | DIASTOLIC BLOOD PRESSURE: 82 MMHG | HEIGHT: 64 IN | RESPIRATION RATE: 18 BRPM

## 2022-05-20 DIAGNOSIS — R73.03 PREDIABETES: ICD-10-CM

## 2022-05-20 DIAGNOSIS — I10 ESSENTIAL (PRIMARY) HYPERTENSION: Primary | ICD-10-CM

## 2022-05-20 DIAGNOSIS — E78.2 MIXED HYPERLIPIDEMIA: ICD-10-CM

## 2022-05-20 PROCEDURE — 99214 OFFICE O/P EST MOD 30 MIN: CPT | Performed by: FAMILY MEDICINE

## 2022-05-20 RX ORDER — LISINOPRIL 20 MG/1
20 TABLET ORAL DAILY
Qty: 90 TABLET | Refills: 1 | Status: SHIPPED | OUTPATIENT
Start: 2022-05-20 | End: 2022-11-28

## 2022-05-20 RX ORDER — ATORVASTATIN CALCIUM 20 MG/1
20 TABLET, FILM COATED ORAL DAILY
Qty: 90 TABLET | Refills: 1 | Status: SHIPPED | OUTPATIENT
Start: 2022-05-20 | End: 2023-01-04 | Stop reason: SDUPTHER

## 2022-05-20 NOTE — PROGRESS NOTES
Chief Complaint  Hypertension (3 month f/u)    Subjective          Aysha Reaves presents to [facility]  History of Present Illness    Hypertension  The patient reports that she checks her blood pressure periodically at home. She states that her blood pressure has been running 130 to 139 systolic. The patient reports that her diastolic blood pressure was 142/82 mmHg a while ago. She states that she was taking lisinopril in the mornings. The patient reports that she had a dry cough for a while, but it was not annoying to her. She denies any chest pain, trouble breathing, or headaches. The patient reports that she has had swelling in her legs for 20 to 30 years. She states that she has not exercised much since the end of 2022 or beginning of 2022 because her left knee has been bothering her. The patient reports that she had meniscal tear surgery in the past. She states that her exercise has backed off, but she has increased her avocado oil every other day. The patient reports that she has not been in the acceptable range in years.    Prediabetes  The patient reports that her A1c was 6.2 percent in 2021. She states that she has not been exercising. The patient reports that she has cut back on breads and pastas. She states that she has toast twice a week and a sandwich once a week.    Allergies  The patient reports that she feels like she has allergy congestion. She states that they have been tickling. The patient reports that they do not hurt.    Family history  The patient reports that her father  at 49 of a heart attack. She states that her siblings have cholesterol issues.    She reports that she has had a kidney stone, saliva gland stone, and gallstones.    Review of Systems   Constitutional: Negative.    HENT: Negative.    Respiratory: Negative.    Cardiovascular: Negative.    Gastrointestinal: Negative.    Musculoskeletal: Positive for arthralgias.     "    Objective       Vital Signs:   /82   Pulse 72   Temp 98.1 °F (36.7 °C)   Resp 18   Ht 163.2 cm (64.25\")   Wt 71.2 kg (157 lb)   BMI 26.74 kg/m²   Recheck blood pressure 132/80.  Physical Exam  Vitals and nursing note reviewed.   Constitutional:       General: She is not in acute distress.     Appearance: She is well-developed. She is not ill-appearing.   HENT:      Head: Normocephalic and atraumatic.      Right Ear: Hearing, tympanic membrane, ear canal and external ear normal.      Left Ear: Hearing, tympanic membrane, ear canal and external ear normal.      Nose: Nose normal. No congestion or rhinorrhea.      Mouth/Throat:      Mouth: Mucous membranes are moist.      Pharynx: No oropharyngeal exudate or posterior oropharyngeal erythema.   Eyes:      General:         Right eye: No discharge.         Left eye: No discharge.      Conjunctiva/sclera: Conjunctivae normal.      Pupils: Pupils are equal, round, and reactive to light.   Neck:      Thyroid: No thyromegaly.   Cardiovascular:      Rate and Rhythm: Normal rate and regular rhythm.      Heart sounds: Normal heart sounds. No murmur heard.    No friction rub. No gallop.   Pulmonary:      Effort: Pulmonary effort is normal. No respiratory distress.      Breath sounds: Normal breath sounds. No wheezing or rales.   Abdominal:      General: Bowel sounds are normal. There is no distension.      Palpations: Abdomen is soft. There is no mass.      Tenderness: There is no abdominal tenderness. There is no guarding or rebound.   Musculoskeletal:      Right lower leg: No edema.      Left lower leg: No edema.   Lymphadenopathy:      Cervical: No cervical adenopathy.   Skin:     General: Skin is warm and dry.      Coloration: Skin is not jaundiced or pale.   Neurological:      General: No focal deficit present.      Mental Status: She is alert.   Psychiatric:         Mood and Affect: Mood normal.         Behavior: Behavior normal.          Result Review : "                     Assessment and Plan    Diagnoses and all orders for this visit:    1. Essential (primary) hypertension (Primary)  -     lisinopril (PRINIVIL,ZESTRIL) 20 MG tablet; Take 1 tablet by mouth Daily.  Dispense: 90 tablet; Refill: 1  -     Comprehensive Metabolic Panel; Future    2. Mixed hyperlipidemia  -     atorvastatin (LIPITOR) 20 MG tablet; Take 1 tablet by mouth Daily.  Dispense: 90 tablet; Refill: 1  -     Comprehensive Metabolic Panel; Future  -     Lipid Panel With / Chol / HDL Ratio; Future    3. Prediabetes  -     metFORMIN (GLUCOPHAGE) 500 MG tablet; Take 1 tablet by mouth Daily With Breakfast.  Dispense: 30 tablet; Refill: 0  -     Hemoglobin A1c; Future      1. Hypertension  - Blood pressure is doing well on lisinopril 20 mg daily. We will continue this dosage. Kidney function is normal. She is not having any side effects.    2. Hyperlipidemia  - Cholesterol is also stable. We will continue atorvastatin dosage. Recheck in 3 months.    3. Prediabetes  - A1c has been hovering around 6.2 for quite some time. She is having a little more difficulty in losing weight. She has been following a low-carb diet. At this point, we will go ahead and start metformin 500 mg once a day to help prevent diabetes and hopefully will help her lose some weight. Side effects explained including diarrhea. She will call in a few weeks if tolerating and if so, we will send in a 90-day supply at that time. Recheck labs in 3 months.        DISCUSSION      Follow Up   Return in about 3 months (around 8/20/2022).    Patient was given instructions and counseling regarding her condition or for health maintenance advice. Please see specific information pulled into the AVS if appropriate.       Camilo Garcia MD     Transcribed from ambient dictation for Camilo Garcia MD by LAYA BENITO.  05/20/22   09:48 EDT    Patient verbalized consent to the visit recording.  I have personally performed the services described in  this document as transcribed by the above individual, and it is both accurate and complete.  Camilo Garcia MD  5/20/2022  10:23 EDT

## 2022-06-13 DIAGNOSIS — R73.03 PREDIABETES: ICD-10-CM

## 2022-07-11 DIAGNOSIS — R73.03 PREDIABETES: ICD-10-CM

## 2022-07-13 DIAGNOSIS — R73.03 PREDIABETES: ICD-10-CM

## 2022-07-13 NOTE — TELEPHONE ENCOUNTER
Caller: Jean Paul Aysha Yessica    Relationship: Self    Best call back number: [phone]    Requested Prescriptions:   Requested Prescriptions     Pending Prescriptions Disp Refills   • metFORMIN (GLUCOPHAGE) 500 MG tablet 30 tablet 1     Sig: Take 1 tablet by mouth Daily With Breakfast.        Pharmacy where request should be sent: [facility]Y #[id number] - [address] AT [place] [address] - [phone] [facility] [phone]      Additional details provided by patient: PATIENT STATES THAT SHE HAS 7 DAYS LEFT BUT HER INSURANCE IS REQUESTING A 90 DAY SCRIPT TO COVER HER MEDICATION.    Does the patient have less than a 3 day supply:  [] Yes  [x] No    Brandy Pulliam Rep   07/13/22 08:47 EDT

## 2022-08-05 ENCOUNTER — TELEPHONE (OUTPATIENT)
Dept: FAMILY MEDICINE CLINIC | Facility: CLINIC | Age: 57
End: 2022-08-05

## 2022-08-05 NOTE — TELEPHONE ENCOUNTER
Caller: Aysha Reaves    Relationship: Self    Best call back number: [phone]    What is the best time to reach you: ANY    Who are you requesting to speak with (clinical staff, provider,  specific staff member): CLINICAL    What was the call regarding: PATIENT NEEDS TO KNOW THE LAST TIME SHE GOT A TDAP SHOT.     Do you require a callback: YES

## 2022-08-12 DIAGNOSIS — I10 ESSENTIAL (PRIMARY) HYPERTENSION: ICD-10-CM

## 2022-08-12 DIAGNOSIS — E78.2 MIXED HYPERLIPIDEMIA: ICD-10-CM

## 2022-08-12 DIAGNOSIS — R73.03 PREDIABETES: ICD-10-CM

## 2022-08-13 LAB
ALBUMIN SERPL-MCNC: 4.5 G/DL (ref 3.5–5.2)
ALBUMIN/GLOB SERPL: 2.3 G/DL
ALP SERPL-CCNC: 84 U/L (ref 39–117)
ALT SERPL-CCNC: 26 U/L (ref 1–33)
AST SERPL-CCNC: 17 U/L (ref 1–32)
BILIRUB SERPL-MCNC: 0.5 MG/DL (ref 0–1.2)
BUN SERPL-MCNC: 14 MG/DL (ref 6–20)
BUN/CREAT SERPL: 17.5 (ref 7–25)
CALCIUM SERPL-MCNC: 9.8 MG/DL (ref 8.6–10.5)
CHLORIDE SERPL-SCNC: 101 MMOL/L (ref 98–107)
CHOLEST SERPL-MCNC: 184 MG/DL (ref 0–200)
CHOLEST/HDLC SERPL: 3.91 {RATIO}
CO2 SERPL-SCNC: 28 MMOL/L (ref 22–29)
CREAT SERPL-MCNC: 0.8 MG/DL (ref 0.57–1)
EGFRCR SERPLBLD CKD-EPI 2021: 86.1 ML/MIN/1.73
GLOBULIN SER CALC-MCNC: 2 GM/DL
GLUCOSE SERPL-MCNC: 108 MG/DL (ref 65–99)
HBA1C MFR BLD: 6.1 % (ref 4.8–5.6)
HDLC SERPL-MCNC: 47 MG/DL (ref 40–60)
LDLC SERPL CALC-MCNC: 116 MG/DL (ref 0–100)
POTASSIUM SERPL-SCNC: 4.5 MMOL/L (ref 3.5–5.2)
PROT SERPL-MCNC: 6.5 G/DL (ref 6–8.5)
SODIUM SERPL-SCNC: 140 MMOL/L (ref 136–145)
TRIGL SERPL-MCNC: 116 MG/DL (ref 0–150)
VLDLC SERPL CALC-MCNC: 21 MG/DL (ref 5–40)

## 2022-08-19 ENCOUNTER — OFFICE VISIT (OUTPATIENT)
Dept: FAMILY MEDICINE CLINIC | Facility: CLINIC | Age: 57
End: 2022-08-19

## 2022-08-19 VITALS
SYSTOLIC BLOOD PRESSURE: 132 MMHG | TEMPERATURE: 98.4 F | RESPIRATION RATE: 18 BRPM | HEIGHT: 64 IN | WEIGHT: 154 LBS | HEART RATE: 76 BPM | DIASTOLIC BLOOD PRESSURE: 82 MMHG | BODY MASS INDEX: 26.29 KG/M2

## 2022-08-19 DIAGNOSIS — I10 ESSENTIAL (PRIMARY) HYPERTENSION: Primary | ICD-10-CM

## 2022-08-19 DIAGNOSIS — E78.2 MIXED HYPERLIPIDEMIA: ICD-10-CM

## 2022-08-19 DIAGNOSIS — R05.9 COUGH: ICD-10-CM

## 2022-08-19 DIAGNOSIS — R73.03 PREDIABETES: ICD-10-CM

## 2022-08-19 PROCEDURE — 99214 OFFICE O/P EST MOD 30 MIN: CPT | Performed by: FAMILY MEDICINE

## 2022-08-19 NOTE — PROGRESS NOTES
"Chief Complaint  Hypertension (3 month f/u)    Subjective          Aysha Reaves presents to [facility]  History of Present Illness    Left knee pain  The patient reports that she has osteoarthritis and believes she has a bone spur in her left knee. She states that she has had x-rays and an MRI, and is about to receive the Orthovisc injection. She reports that she goes to [facility] in [place], [place].      Hypertension  She states that her blood pressure may be elevated today due to traffic.    Hyperlipidemia  She states that she has not changed her diet, but she has decreased her exercise secondary to her knee. She reports that her home scale shows a decrease in weight, which she attributes to metformin. She states that she initially had an upset stomach with the metformin; however, it has since resolved. She denies having any chest pain, shortness of breath, or headaches. She states that her energy level has been okay.    Cough  She reports that she has a daily cough that usually occurs when she is eating. She reports that she had COVID in 06/2022 and it was a mild case. She states that she has received 2 doses of the Moderna COVID vaccine and the booster dose in 10/2021. 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 She states that she always wears a mask.    Review of Systems   Constitutional: Negative.    HENT: Negative.    Respiratory: Negative.    Cardiovascular: Negative.    Gastrointestinal: Negative.    Musculoskeletal: Positive for arthralgias.        Objective       Vital Signs:   /82   Pulse 76   Temp 98.4 °F (36.9 °C)   Resp 18   Ht 163.2 cm (64.25\")   Wt 69.9 kg (154 lb)   BMI 26.23 kg/m²     Physical Exam  Vitals and nursing note reviewed.   Constitutional:       Appearance: She is well-developed.   HENT:      Head: Normocephalic and atraumatic.      Right Ear: Hearing and " external ear normal.      Left Ear: Hearing and external ear normal.   Eyes:      Conjunctiva/sclera: Conjunctivae normal.      Pupils: Pupils are equal, round, and reactive to light.   Cardiovascular:      Rate and Rhythm: Normal rate and regular rhythm.      Heart sounds: Normal heart sounds. No murmur heard.    No friction rub.   Pulmonary:      Effort: Pulmonary effort is normal. No respiratory distress.      Breath sounds: Normal breath sounds. No wheezing or rales.   Musculoskeletal:      Right lower leg: No edema.      Left lower leg: No edema.   Skin:     General: Skin is warm.   Neurological:      Mental Status: She is alert.   Psychiatric:         Behavior: Behavior normal.          Result Review :                     Assessment and Plan    Diagnoses and all orders for this visit:    1. Essential (primary) hypertension (Primary)  -     Comprehensive Metabolic Panel; Future    2. Mixed hyperlipidemia  -     Comprehensive Metabolic Panel; Future  -     Lipid Panel With / Chol / HDL Ratio; Future    3. Prediabetes  -     Hemoglobin A1c; Future    4. Cough          DISCUSSION    1. Hypertension  - She is doing quite well. Continue lisinopril 20 mg daily.    2. Hyperlipidemia  - Increased a little bit. She has not been able to exercise and her diet has not been quite as good. She will continue to work on that and recheck in 6 months.    3. Prediabetes  - Her A1c decreased to 6.1 with metformin 500 mg daily, so she will continue that medication and recheck in 6 months.    4. Mild cough  - She has also complained of a mild cough, maybe more of an allergic type cough that was occurring before she had COVID-19 in 06/2022. Lungs are clear today and she will follow up if worsening.    Follow Up   Return in about 6 months (around 2/19/2023).    Patient was given instructions and counseling regarding her condition or for health maintenance advice. Please see specific information pulled into the AVS if appropriate.        Camilo Garcia MD     Transcribed from ambient dictation for Camilo Garcia MD by JAN TORRES.  08/19/22   09:45 EDT    Patient verbalized consent to the visit recording.  I have personally performed the services described in this document as transcribed by the above individual, and it is both accurate and complete.  Camiol Garcia MD  8/21/2022  18:21 EDT

## 2022-10-10 DIAGNOSIS — R73.03 PREDIABETES: ICD-10-CM

## 2022-11-27 DIAGNOSIS — I10 ESSENTIAL (PRIMARY) HYPERTENSION: ICD-10-CM

## 2022-11-28 RX ORDER — LISINOPRIL 20 MG/1
TABLET ORAL
Qty: 90 TABLET | Refills: 1 | Status: SHIPPED | OUTPATIENT
Start: 2022-11-28 | End: 2023-01-04 | Stop reason: SDUPTHER

## 2023-01-04 DIAGNOSIS — E78.2 MIXED HYPERLIPIDEMIA: ICD-10-CM

## 2023-01-04 DIAGNOSIS — I10 ESSENTIAL (PRIMARY) HYPERTENSION: ICD-10-CM

## 2023-01-04 RX ORDER — ATORVASTATIN CALCIUM 20 MG/1
20 TABLET, FILM COATED ORAL DAILY
Qty: 90 TABLET | Refills: 1 | Status: SHIPPED | OUTPATIENT
Start: 2023-01-04 | End: 2023-03-15 | Stop reason: SDUPTHER

## 2023-01-04 RX ORDER — LISINOPRIL 20 MG/1
20 TABLET ORAL DAILY
Qty: 90 TABLET | Refills: 1 | Status: SHIPPED | OUTPATIENT
Start: 2023-01-04 | End: 2023-03-15 | Stop reason: SDUPTHER

## 2023-01-04 NOTE — TELEPHONE ENCOUNTER
Medications sent in. Patient is due for appointment around 2/19. Please call to schedule. Labs are in chart can stop in before appointment to have done.

## 2023-01-04 NOTE — TELEPHONE ENCOUNTER
SPOKE WITH PT SHE IS STARTING A NEW JOB IN [place] AND ONLY HAS WednesdayS OFF SHE IS GOING TO MESSAGE DR STINSON PERSONALLY AND ASK HIM ABOUT ACCOMMODATING HER

## 2023-01-04 NOTE — TELEPHONE ENCOUNTER
Caller: Aysha Reaves    Relationship: Self    Best call back number: [phone]    Requested Prescriptions:   Requested Prescriptions     Pending Prescriptions Disp Refills   • atorvastatin (LIPITOR) 20 MG tablet 90 tablet 1     Sig: Take 1 tablet by mouth Daily.   • lisinopril (PRINIVIL,ZESTRIL) 20 MG tablet 90 tablet 1     Sig: Take 1 tablet by mouth Daily.        Pharmacy where request should be sent: [facility]/PH[facility]ACY #[id number] - [place], [place] - [address] [place] [address] AT [place] [address] - [phone] [facility] [phone]      Additional details provided by patient: PLEASE REFILL 90 DAY SUPPLY WITH REFILLS. ASKING IF SHE IS DUE FOR A FOLLOW UP APPOINTMENT OR ANY LABS OR TESTING. PLEASE CALL TO ADVISE    Does the patient have less than a 3 day supply:  [] Yes  [x] No    Would you like a call back once the refill request has been completed: [x] Yes [] No    If the office needs to give you a call back, can they leave a voicemail: [x] Yes [] No    Brandy Aguilera Rep   01/04/23 08:48 EST

## 2023-01-16 NOTE — TELEPHONE ENCOUNTER
----- Message from Selena Nayak sent at 12/7/2018 12:55 PM EST -----  Contact: SHANTELL;PT WAS HERE  PT STATES SHE NEEDS HER ANNUAL RX FOR LIPTOR SENT TO  [facility]    [phone]   2252- Called Vascular surgery  Per   RE dissection  2258-  returned page

## 2023-01-24 ENCOUNTER — TELEPHONE (OUTPATIENT)
Dept: FAMILY MEDICINE CLINIC | Facility: CLINIC | Age: 58
End: 2023-01-24
Payer: COMMERCIAL

## 2023-01-24 NOTE — TELEPHONE ENCOUNTER
Caller: Aysha Reaves    Relationship: Self    Best call back number: [phone]    What orders are you requesting (i.e. lab or imaging): LABS    In what timeframe would the patient need to come in: BEFORE 3/15/23 APPOINTMENT    Additional notes:     PATIENT CAN GO TO [facility] ON WEEKENDS    PLEASE CALL PATIENT WHEN LAB ORDERS ARE PUT IN SO THAT SHE WILL KNOW WHEN SHE CAN GO HAVE BLOOD DRAWN    DR STINSON DID NOT HAVE ANY AVAILABLE APPOINTMENTS WHEN PATIENT COULD COME IN ON 3/1, 3/15, OR 3/29

## 2023-03-06 DIAGNOSIS — E78.2 MIXED HYPERLIPIDEMIA: ICD-10-CM

## 2023-03-06 DIAGNOSIS — I10 ESSENTIAL (PRIMARY) HYPERTENSION: ICD-10-CM

## 2023-03-06 DIAGNOSIS — R73.03 PREDIABETES: ICD-10-CM

## 2023-03-07 LAB
ALBUMIN SERPL-MCNC: 4.7 G/DL (ref 3.5–5.2)
ALBUMIN/GLOB SERPL: 2 G/DL
ALP SERPL-CCNC: 79 U/L (ref 39–117)
ALT SERPL-CCNC: 16 U/L (ref 1–33)
AST SERPL-CCNC: 16 U/L (ref 1–32)
BILIRUB SERPL-MCNC: 0.7 MG/DL (ref 0–1.2)
BUN SERPL-MCNC: 15 MG/DL (ref 6–20)
BUN/CREAT SERPL: 20.5 (ref 7–25)
CALCIUM SERPL-MCNC: 10.5 MG/DL (ref 8.6–10.5)
CHLORIDE SERPL-SCNC: 102 MMOL/L (ref 98–107)
CHOLEST SERPL-MCNC: 178 MG/DL (ref 0–200)
CHOLEST/HDLC SERPL: 3.63 {RATIO}
CO2 SERPL-SCNC: 31 MMOL/L (ref 22–29)
CREAT SERPL-MCNC: 0.73 MG/DL (ref 0.57–1)
EGFRCR SERPLBLD CKD-EPI 2021: 95.5 ML/MIN/1.73
GLOBULIN SER CALC-MCNC: 2.4 GM/DL
GLUCOSE SERPL-MCNC: 109 MG/DL (ref 65–99)
HBA1C MFR BLD: 6.1 % (ref 4.8–5.6)
HDLC SERPL-MCNC: 49 MG/DL (ref 40–60)
LDLC SERPL CALC-MCNC: 112 MG/DL (ref 0–100)
POTASSIUM SERPL-SCNC: 4.7 MMOL/L (ref 3.5–5.2)
PROT SERPL-MCNC: 7.1 G/DL (ref 6–8.5)
SODIUM SERPL-SCNC: 139 MMOL/L (ref 136–145)
TRIGL SERPL-MCNC: 93 MG/DL (ref 0–150)
VLDLC SERPL CALC-MCNC: 17 MG/DL (ref 5–40)

## 2023-03-15 ENCOUNTER — OFFICE VISIT (OUTPATIENT)
Dept: FAMILY MEDICINE CLINIC | Facility: CLINIC | Age: 58
End: 2023-03-15
Payer: COMMERCIAL

## 2023-03-15 VITALS
WEIGHT: 155.8 LBS | HEART RATE: 70 BPM | SYSTOLIC BLOOD PRESSURE: 126 MMHG | RESPIRATION RATE: 12 BRPM | DIASTOLIC BLOOD PRESSURE: 84 MMHG | BODY MASS INDEX: 26.6 KG/M2 | OXYGEN SATURATION: 98 % | HEIGHT: 64 IN | TEMPERATURE: 97.1 F

## 2023-03-15 DIAGNOSIS — R73.03 PREDIABETES: Primary | ICD-10-CM

## 2023-03-15 DIAGNOSIS — I10 ESSENTIAL (PRIMARY) HYPERTENSION: ICD-10-CM

## 2023-03-15 DIAGNOSIS — E78.2 MIXED HYPERLIPIDEMIA: ICD-10-CM

## 2023-03-15 PROCEDURE — 99214 OFFICE O/P EST MOD 30 MIN: CPT | Performed by: FAMILY MEDICINE

## 2023-03-15 RX ORDER — LISINOPRIL 20 MG/1
20 TABLET ORAL DAILY
Qty: 90 TABLET | Refills: 1 | Status: SHIPPED | OUTPATIENT
Start: 2023-03-15 | End: 2023-03-15 | Stop reason: SINTOL

## 2023-03-15 RX ORDER — ATORVASTATIN CALCIUM 20 MG/1
20 TABLET, FILM COATED ORAL DAILY
Qty: 90 TABLET | Refills: 1 | Status: SHIPPED | OUTPATIENT
Start: 2023-03-15

## 2023-03-15 RX ORDER — LOSARTAN POTASSIUM 50 MG/1
50 TABLET ORAL DAILY
Qty: 90 TABLET | Refills: 1 | Status: SHIPPED | OUTPATIENT
Start: 2023-03-15

## 2023-03-15 NOTE — PROGRESS NOTES
Chief Complaint  Med Refill    Subjective          Aysha Reaves presents to [facility]  History of Present Illness  Aysha Reaves is a 57-year-old female presenting today for follow-up, Dr. Garcia's patient.    Her A1c 3/6/2023 was 6.1%.  She currently takes metformin for treatment of prediabetes.  She reports over the last year she has not been watching her diet or exercising regularly.    Her cholesterol has decreased since her last check.  She is taking atorvastatin.  She reports a family history of high cholesterol.    She reports the lisinopril has improved her blood pressure.  She notes she has been having a lingering cough that does bother her some. Denies shortness of breath, only a dry cough.     She is up-to-date on Pap smear.  Her last colonoscopy was in 2016 and is due for repeat in 2026.    The following portions of the patient's history were reviewed and updated as appropriate: allergies, current medications, past family history, past medical history, past social history, past surgical history and problem list.    Objective      Physical Exam  Vitals reviewed.   Constitutional:       Appearance: She is well-developed.   Cardiovascular:      Rate and Rhythm: Normal rate and regular rhythm.      Heart sounds: Normal heart sounds.   Pulmonary:      Effort: Pulmonary effort is normal.      Breath sounds: Normal breath sounds.   Neurological:      Mental Status: She is alert.   Psychiatric:         Behavior: Behavior normal.        Result Review :                Assessment and Plan    Diagnoses and all orders for this visit:    1. Prediabetes (Primary)  -     metFORMIN (GLUCOPHAGE) 500 MG tablet; Take 1 tablet by mouth Daily With Breakfast.  Dispense: 90 tablet; Refill: 1    2. Essential (primary) hypertension  -     Discontinue: lisinopril (PRINIVIL,ZESTRIL) 20 MG tablet; Take 1 tablet by mouth Daily.  Dispense: 90 tablet; Refill: 1  -     losartan (Cozaar) 50 MG  tablet; Take 1 tablet by mouth Daily.  Dispense: 90 tablet; Refill: 1    3. Mixed hyperlipidemia  -     atorvastatin (LIPITOR) 20 MG tablet; Take 1 tablet by mouth Daily.  Dispense: 90 tablet; Refill: 1    1.Prediabetes  - Refill metformin and continue taking as prescribed.  - Advised of lifestyle and dietary changes.    2. Hyperlipidemia  - Continue atorvastatin as prescribed.    3. Hypertension  - Discontinue Lisinopril.  - Begin Losartan 50 mg.   - Monitor blood pressure closely for a few weeks during transition.      Follow Up   Return in about 6 months (around 9/15/2023) for Recheck.  Patient was given instructions and counseling regarding her condition or for health maintenance advice. Please see specific information pulled into the AVS if appropriate.   Answers for HPI/ROS submitted by the patient on 3/13/2023  Please describe your symptoms.: Semi-annual checkup, renew Rx for statin and metformin, lisinopril  Have you had these symptoms before?: Yes  How long have you been having these symptoms?: Greater than 2 weeks  What is the primary reason for your visit?: Other    Transcribed from ambient dictation for Michelle Romero DO by Yanet Last.  03/15/23   11:45 EDT    Patient or patient representative verbalized consent to the visit recording.  I have personally performed the services described in this document as transcribed by the above individual, and it is both accurate and complete.  Michelle Romero DO  3/16/2023  07:56 EDT

## 2023-04-10 DIAGNOSIS — R73.03 PREDIABETES: ICD-10-CM

## 2023-04-17 ENCOUNTER — TRANSCRIBE ORDERS (OUTPATIENT)
Dept: ADMINISTRATIVE | Facility: HOSPITAL | Age: 58
End: 2023-04-17
Payer: COMMERCIAL

## 2023-04-17 DIAGNOSIS — Z12.31 VISIT FOR SCREENING MAMMOGRAM: Primary | ICD-10-CM

## 2023-05-24 ENCOUNTER — HOSPITAL ENCOUNTER (OUTPATIENT)
Dept: MAMMOGRAPHY | Facility: HOSPITAL | Age: 58
Discharge: HOME | End: 2023-05-24
Admitting: NURSE PRACTITIONER
Payer: COMMERCIAL

## 2023-05-24 DIAGNOSIS — Z12.31 VISIT FOR SCREENING MAMMOGRAM: ICD-10-CM

## 2023-05-24 PROCEDURE — 77063 BREAST TOMOSYNTHESIS BI: CPT

## 2023-05-24 PROCEDURE — 77067 SCR MAMMO BI INCL CAD: CPT

## 2023-09-10 DIAGNOSIS — I10 ESSENTIAL (PRIMARY) HYPERTENSION: ICD-10-CM

## 2023-09-11 RX ORDER — LOSARTAN POTASSIUM 50 MG/1
TABLET ORAL
Qty: 90 TABLET | Refills: 1 | Status: SHIPPED | OUTPATIENT
Start: 2023-09-11

## 2023-10-01 ENCOUNTER — NURSE TRIAGE (OUTPATIENT)
Dept: CALL CENTER | Facility: HOSPITAL | Age: 58
End: 2023-10-01
Payer: COMMERCIAL

## 2023-10-01 NOTE — TELEPHONE ENCOUNTER
"Reason for Disposition   [1] Caller requesting NON-URGENT health information AND [2] PCP's office is the best resource    Additional Information   Negative: [1] Caller is not with the adult (patient) AND [2] reporting urgent symptoms   Negative: Lab result questions   Negative: Medication questions   Negative: Caller can't be reached by phone   Negative: Caller has already spoken to PCP or another triager   Negative: RN needs further essential information from caller in order to complete triage   Negative: Requesting regular office appointment    Answer Assessment - Initial Assessment Questions  1. REASON FOR CALL or QUESTION: \"What is your reason for calling today?\" or \"How can I best help you?\" or \"What question do you have that I can help answer?\"      Patient left , needs paper lab orders so she can pick them up to refill her metformin, per patient can not get refill until labs done and she wants to  order on Monday.    Protocols used: Information Only Call-ADULT-    "

## 2023-10-02 ENCOUNTER — TELEPHONE (OUTPATIENT)
Dept: FAMILY MEDICINE CLINIC | Facility: CLINIC | Age: 58
End: 2023-10-02
Payer: COMMERCIAL

## 2023-10-02 DIAGNOSIS — E78.2 MIXED HYPERLIPIDEMIA: ICD-10-CM

## 2023-10-02 DIAGNOSIS — R73.03 PREDIABETES: ICD-10-CM

## 2023-10-02 DIAGNOSIS — I10 ESSENTIAL (PRIMARY) HYPERTENSION: Primary | ICD-10-CM

## 2023-10-02 NOTE — TELEPHONE ENCOUNTER
"    Caller: Aysha Reaves \"FLAKO\"    Relationship: Self    Best call back number: [phone]     What orders are you requesting (i.e. lab or imaging): LAB ORDERS    In what timeframe would the patient need to come in: ASAP    Where will you receive your lab/imaging services: HOSPITAL    Additional notes: CALL WHEN PRINTED OUT AND  WILL   PLEASE LEAVE VOICE MAIL  "

## 2023-10-02 NOTE — TELEPHONE ENCOUNTER
Please call and confirm lab request. She had called the nurse on call yesterday.     Does she want to get labs at [facility] and need printed order or have drawn in our office?

## 2023-10-11 DIAGNOSIS — R73.03 PREDIABETES: ICD-10-CM

## 2023-10-11 NOTE — TELEPHONE ENCOUNTER
"  Caller: Aysha Reaves \"FLAKO\"    Relationship: Self    Best call back number: [phone]     Requested Prescriptions:   Requested Prescriptions     Pending Prescriptions Disp Refills    metFORMIN (GLUCOPHAGE) 500 MG tablet 90 tablet 1     Sig: Take 1 tablet by mouth Daily With Breakfast.        Pharmacy where request should be sent: [address] - [phone] [facility] [phone]      Last office visit with prescribing clinician: 8/19/2022   Last telemedicine visit with prescribing clinician: Visit date not found   Next office visit with prescribing clinician: Visit date not found     Additional details provided by patient:     Does the patient have less than a 3 day supply:  [] Yes  [x] No    Would you like a call back once the refill request has been completed: [] Yes [x] No    If the office needs to give you a call back, can they leave a voicemail: [] Yes [x] No    Brandy Bianchi Rep   10/11/23 11:01 EDT     "

## 2023-10-20 ENCOUNTER — OFFICE VISIT (OUTPATIENT)
Dept: FAMILY MEDICINE CLINIC | Facility: CLINIC | Age: 58
End: 2023-10-20
Payer: COMMERCIAL

## 2023-10-20 VITALS
DIASTOLIC BLOOD PRESSURE: 74 MMHG | BODY MASS INDEX: 26.29 KG/M2 | SYSTOLIC BLOOD PRESSURE: 130 MMHG | RESPIRATION RATE: 18 BRPM | HEIGHT: 64 IN | TEMPERATURE: 98.4 F | WEIGHT: 154 LBS | HEART RATE: 76 BPM

## 2023-10-20 DIAGNOSIS — E78.2 MIXED HYPERLIPIDEMIA: ICD-10-CM

## 2023-10-20 DIAGNOSIS — T14.8XXA BRUISING: ICD-10-CM

## 2023-10-20 DIAGNOSIS — I10 ESSENTIAL (PRIMARY) HYPERTENSION: ICD-10-CM

## 2023-10-20 DIAGNOSIS — R73.03 PREDIABETES: Primary | ICD-10-CM

## 2023-10-20 RX ORDER — ATORVASTATIN CALCIUM 20 MG/1
20 TABLET, FILM COATED ORAL DAILY
Qty: 90 TABLET | Refills: 1 | Status: SHIPPED | OUTPATIENT
Start: 2023-10-20

## 2023-10-20 NOTE — PROGRESS NOTES
Chief Complaint  Hypertension (F/u ) and Bleeding/Bruising (Bloodwork )    Subjective          Aysha Reaves presents to [facility]  History of Present Illness    Aysha Reaves is a 58-year-old female who presents today for a follow-up.    Bruising  She conveys that she has been feeling well. She is concerned that she has had some blood draws since the end of 08/2023 for several reasons. Each time she ends up with big bruises. In the past, she rarely bruised when she had a steroid shot. She had a PRP injection done on her knee at the end of 08/2023. They had to do considerable blood draw. They tried several different places, but they ended up in the forearm. She does not bruise easily. She had a blood draw for a TB test in the first or second week of 09/2023. She works at [employer] in [place]. She did another big, massive bruise there in 08/2023, 2 to 3 weeks later in 09/2023, and 08/05/2023. She just had a flu shot a week ago and she still has a large bruise there. She denies any blood in her urine or blood in her bowel movements, vaginal bleeding, bleeding when she brushes her teeth, denies any other bruising problems.  She denies any other problems of any kind.    Prediabetes  She needs a refill on her metformin. She has not exercised regularly in over a year and a half. She has lost a couple of pounds. She has not been exercising like she used to because her knee had been bothering her a lot. She had gone to a doctor in [place] and the sports medicine doctor at . They ended up doing the PRP plasma injection at the end of 08/2023 because her joint is missing a bunch of cartilage. She has osteoarthritis there. She started doing some physical therapy for her knee post injection. She has been told that she is weak in her legs. Her left leg is a lot smaller.     Hypertension  She checks her blood pressure work. Her blood pressure has been running in the 140s mmHg,  "systolic in the middle of the afternoon. She was taking lisinopril, but it made her have a cough. When she quit taking that, she started doing the losartan and that cough went away.    Hyperlipidemia  She is due for a refill of atorvastatin.    Health maintenance  She had a very mild case of COVID-19 a year ago in the summer. She had a scratchy throat that sounded like a smoker's voice for about 5 days during the week. She has been fine ever since. She had a mammogram. She had a colonoscopy in 2016. She was told that she did not need to see her for 10 years. Her last eye exam was 06/2023 or 07/2023. She denies any trouble urinating. She had her flu vaccine.    Family history   She denies any family history of bleeding.         Body mass index is 26.23 kg/m².  BMI is >= 25 and <30. (Overweight) The following options were offered after discussion;: exercise counseling/recommendations and nutrition counseling/recommendations     Review of Systems   Constitutional: Negative.    HENT: Negative.     Respiratory: Negative.     Cardiovascular: Negative.    Gastrointestinal: Negative.    Skin:  Positive for bruise.   Neurological: Negative.    Psychiatric/Behavioral: Negative.     All other systems reviewed and are negative.       Objective       Vital Signs:   /74   Pulse 76   Temp 98.4 °F (36.9 °C)   Resp 18   Ht 163.2 cm (64.25\")   Wt 69.9 kg (154 lb)   BMI 26.23 kg/m²     Physical Exam  Vitals and nursing note reviewed.   Constitutional:       General: She is not in acute distress.     Appearance: She is well-developed. She is not ill-appearing.   HENT:      Head: Normocephalic and atraumatic.      Right Ear: Hearing, tympanic membrane, ear canal and external ear normal.      Left Ear: Hearing, tympanic membrane, ear canal and external ear normal.      Nose: Nose normal. No congestion or rhinorrhea.      Mouth/Throat:      Mouth: Mucous membranes are moist.      Pharynx: No oropharyngeal exudate or posterior " oropharyngeal erythema.   Eyes:      General:         Right eye: No discharge.         Left eye: No discharge.      Conjunctiva/sclera: Conjunctivae normal.      Pupils: Pupils are equal, round, and reactive to light.   Neck:      Thyroid: No thyromegaly.      Vascular: No carotid bruit.   Cardiovascular:      Rate and Rhythm: Normal rate and regular rhythm.      Heart sounds: Normal heart sounds. No murmur heard.     No friction rub. No gallop.   Pulmonary:      Effort: Pulmonary effort is normal. No respiratory distress.      Breath sounds: Normal breath sounds. No wheezing or rales.   Abdominal:      General: Bowel sounds are normal. There is no distension.      Palpations: Abdomen is soft. There is no mass.      Tenderness: There is no abdominal tenderness. There is no guarding or rebound.   Musculoskeletal:      Right lower leg: No edema.      Left lower leg: No edema.   Lymphadenopathy:      Cervical: No cervical adenopathy.   Skin:     General: Skin is warm and dry.      Coloration: Skin is not jaundiced or pale.   Neurological:      Mental Status: She is alert.   Psychiatric:         Mood and Affect: Mood normal.        Some faint bruises were seen in the arms.    Result Review :                     Assessment and Plan    Diagnoses and all orders for this visit:    1. Prediabetes (Primary)    2. Mixed hyperlipidemia  -     atorvastatin (LIPITOR) 20 MG tablet; Take 1 tablet by mouth Daily.  Dispense: 90 tablet; Refill: 1    3. Essential (primary) hypertension    4. Bruising          DISCUSSION    Prediabetes.  - Her hemoglobin A1c is at 6.2 percent.   - Continue metformin.   - Continue diet changes and weight loss as well.    Hypertension.  - Blood pressure is also good on losartan.   - She had previous issues with lisinopril causing a cough.    Hyperlipidemia.  - Cholesterol is doing much better.   - Continue atorvastatin.     Bruising.  - Unclear etiology. She has no other signs or symptoms of bleeding.   -  She has no gum bleeding. There is no blood in her urine or stool. No unexplained bruising. The bruising occurred with injection either with blood draw or vaccination.   - We will continue to monitor and if this worsens, she will let me know.    Follow up in 6 months.          Follow Up   Return in about 6 months (around 4/20/2024).    Patient was given instructions and counseling regarding her condition or for health maintenance advice. Please see specific information pulled into the AVS if appropriate.       Camilo Garcia MD    Transcribed from ambient dictation for Camilo Garcia MD by Chaparro Conte.  10/20/23   18:00 EDT    Patient or patient representative verbalized consent to the visit recording.  I have personally performed the services described in this document as transcribed by the above individual, and it is both accurate and complete.  Camilo Garcia MD  10/23/2023  18:46 EDT

## 2024-03-07 ENCOUNTER — TELEPHONE (OUTPATIENT)
Dept: FAMILY MEDICINE CLINIC | Facility: CLINIC | Age: 59
End: 2024-03-07

## 2024-03-07 DIAGNOSIS — I10 ESSENTIAL (PRIMARY) HYPERTENSION: ICD-10-CM

## 2024-03-07 RX ORDER — LOSARTAN POTASSIUM 50 MG/1
50 TABLET ORAL DAILY
Qty: 90 TABLET | Refills: 1 | Status: SHIPPED | OUTPATIENT
Start: 2024-03-07

## 2024-03-07 NOTE — TELEPHONE ENCOUNTER
PATIENT HAS CALLED REQUESTING IF LAB ORDERS CAN BE PUT IN IN PREPARATION FOR HER UPCOMING APPOINTMENT ON 03/13/24. PATIENT WILL NEED LAB ORDERS PUT IN AND A COPY LEFT AT THE  FOR HER TO  TODAY. PATIENT IS REQUESTING A CALL BACK ONCE ORDERS ARE READY FOR .    CALL BACK NUMBER [phone]

## 2024-03-07 NOTE — TELEPHONE ENCOUNTER
Dr. Garcia patient. Typically we order labs at time of visit and obtain immediately after visit. Where I am not as familiar with her I would like to discuss with her any symptoms so I know what we should order for her in addition to her chronic issues. We can do a finger stick in office to tell her what her blood sugar is at time of visit. She was also not scheduled for annual visit but she is due for this. If she would like this free visit, we can schedule it as such and just have the front block my next available slot on Monday. If we do this and order labs at time of visit, this will also be covered as preventative

## 2024-03-07 NOTE — TELEPHONE ENCOUNTER
"Caller: Aysha Reaves \"FLAKO\"    Relationship: Self    Best call back number: [phone]     Requested Prescriptions:   Requested Prescriptions     Pending Prescriptions Disp Refills    losartan (COZAAR) 50 MG tablet 90 tablet 1     Sig: Take 1 tablet by mouth Daily.        Pharmacy where request should be sent: [facility]/PH[facility]ACY #[id number] - [address] AT [place] [address] - [phone] [facility] [phone] FX     Last office visit with prescribing clinician: 10/20/2023   Last telemedicine visit with prescribing clinician: Visit date not found   Next office visit with prescribing clinician: Visit date not found     Additional details provided by patient: PATIENT HAS CALLED REQUESTING A NEW PRESCRIPTION ON ABOVE MEDICATION.    Does the patient have less than a 3 day supply:  [x] Yes  [] No    Would you like a call back once the refill request has been completed: [] Yes [x] No    If the office needs to give you a call back, can they leave a voicemail: [] Yes [x] No    Cameron Vega   03/07/24 12:25 EST         "

## 2024-03-13 ENCOUNTER — OFFICE VISIT (OUTPATIENT)
Dept: FAMILY MEDICINE CLINIC | Facility: CLINIC | Age: 59
End: 2024-03-13
Payer: COMMERCIAL

## 2024-03-13 VITALS
OXYGEN SATURATION: 97 % | BODY MASS INDEX: 26.32 KG/M2 | WEIGHT: 154.2 LBS | HEART RATE: 65 BPM | SYSTOLIC BLOOD PRESSURE: 154 MMHG | HEIGHT: 64 IN | TEMPERATURE: 97.1 F | DIASTOLIC BLOOD PRESSURE: 86 MMHG | RESPIRATION RATE: 18 BRPM

## 2024-03-13 DIAGNOSIS — E78.2 MIXED HYPERLIPIDEMIA: ICD-10-CM

## 2024-03-13 DIAGNOSIS — E55.9 VITAMIN D DEFICIENCY: ICD-10-CM

## 2024-03-13 DIAGNOSIS — Z13.21 ENCOUNTER FOR VITAMIN DEFICIENCY SCREENING: ICD-10-CM

## 2024-03-13 DIAGNOSIS — Z00.00 ENCOUNTER FOR WELL ADULT EXAM WITHOUT ABNORMAL FINDINGS: Primary | ICD-10-CM

## 2024-03-13 DIAGNOSIS — Z13.0 SCREENING FOR IRON DEFICIENCY ANEMIA: ICD-10-CM

## 2024-03-13 DIAGNOSIS — I10 ESSENTIAL (PRIMARY) HYPERTENSION: ICD-10-CM

## 2024-03-13 DIAGNOSIS — Z13.29 SCREENING FOR THYROID DISORDER: ICD-10-CM

## 2024-03-13 DIAGNOSIS — R73.03 PREDIABETES: ICD-10-CM

## 2024-03-13 LAB
EXPIRATION DATE: ABNORMAL
HBA1C MFR BLD: 6 % (ref 4.5–5.7)
Lab: ABNORMAL

## 2024-03-13 RX ORDER — PROGESTERONE 100 MG/1
100 CAPSULE ORAL DAILY
COMMUNITY
Start: 2024-02-28

## 2024-03-13 RX ORDER — ATORVASTATIN CALCIUM 20 MG/1
20 TABLET, FILM COATED ORAL DAILY
Qty: 90 TABLET | Refills: 1 | Status: SHIPPED | OUTPATIENT
Start: 2024-03-13

## 2024-03-13 RX ORDER — LOSARTAN POTASSIUM 50 MG/1
50 TABLET ORAL DAILY
Qty: 90 TABLET | Refills: 1 | Status: SHIPPED | OUTPATIENT
Start: 2024-03-13

## 2024-03-13 NOTE — PROGRESS NOTES
"Chief Complaint   Patient presents with    Annual Exam     physical       Subjective   The patient is a 58 y.o. female who presents for annual exam      Nutrition:  needs improvement.   Exercise:  little routine exercise. Plans to change this and get more active     Last Colonoscopy:  2016     Past Gynecological History:     No LMP recorded. Patient has had a hysterectomy.     Follows with GYN. On estradiol and progesterone for HRT.  Has been on this since early 2000s      Date of last mammogram: was done on approximately 5/2023 and the result was: Birads I (Normal).    Past Medical History,Medications, Allergies reviewed.     HPI  Patient of Dr. Garcia's presenting for annual physical and follow up for HTN, Hyperlipidemia, and prediabetes   Has been taking medication as directed   Due for labs.   BP up in office. Does not check regularly at home. Notes she has been taking losartan   Prediabetes has been relatively unchanged since starting low dose metformin. Pt states she wants to work on nutrition  and exercise and follow up in 3 months to see if she can make improvements with this   Notes she has not been exercising for the last 2 years and plans to make some changes.     L knee issues. Follows with [facility]. Has had meniscus surgery, gel injections, steroid injections and PRP    UTD on dental and eye exam     Objective     /86   Pulse 65   Temp 97.1 °F (36.2 °C)   Resp 18   Ht 163.2 cm (64.25\")   Wt 69.9 kg (154 lb 3.2 oz)   SpO2 97%   BMI 26.26 kg/m²     Physical Exam  Vitals and nursing note reviewed.   Constitutional:       Appearance: Normal appearance. She is well-developed.   HENT:      Head: Normocephalic and atraumatic.      Right Ear: Tympanic membrane, ear canal and external ear normal.      Left Ear: Tympanic membrane, ear canal and external ear normal.      Nose: Nose normal.      Mouth/Throat:      Pharynx: No oropharyngeal exudate.   Eyes:      Conjunctiva/sclera: Conjunctivae " normal.   Neck:      Thyroid: No thyromegaly.      Trachea: No tracheal deviation.   Cardiovascular:      Rate and Rhythm: Normal rate and regular rhythm.      Heart sounds: Normal heart sounds.   Pulmonary:      Effort: Pulmonary effort is normal. No respiratory distress.      Breath sounds: Normal breath sounds. No wheezing or rales.   Chest:      Chest wall: No tenderness.   Abdominal:      General: Bowel sounds are normal. There is no distension.      Palpations: Abdomen is soft. There is no mass.      Tenderness: There is no abdominal tenderness. There is no guarding or rebound.      Hernia: No hernia is present.   Musculoskeletal:      Cervical back: Normal range of motion and neck supple.   Lymphadenopathy:      Cervical: No cervical adenopathy.   Skin:     General: Skin is warm and dry.   Neurological:      Mental Status: She is alert and oriented to person, place, and time.   Psychiatric:         Mood and Affect: Mood normal.         Behavior: Behavior normal.         Thought Content: Thought content normal.         Judgment: Judgment normal.         Assessment & Plan     1. Encounter for well adult exam without abnormal findings  - CBC w AUTO Differential  - Comprehensive metabolic panel  - Lipid Panel  - TSH  - T4, free  - Vitamin D 25 hydroxy  - Iron Profile  - Vitamin B12  - Folate  - POC Glycosylated Hemoglobin (Hb A1C)  - Insulin, Total  - Cortisol - AM    2. Mixed hyperlipidemia  - CBC w AUTO Differential  - Comprehensive metabolic panel  - Lipid Panel  - atorvastatin (LIPITOR) 20 MG tablet; Take 1 tablet by mouth Daily.  Dispense: 90 tablet; Refill: 1    3. Essential (primary) hypertension  - CBC w AUTO Differential  - Comprehensive metabolic panel  - losartan (COZAAR) 50 MG tablet; Take 1 tablet by mouth Daily.  Dispense: 90 tablet; Refill: 1    4. Prediabetes  - CBC w AUTO Differential  - Comprehensive metabolic panel  - POC Glycosylated Hemoglobin (Hb A1C)  - Insulin, Total  - metFORMIN  (GLUCOPHAGE) 500 MG tablet; Take 1 tablet by mouth Daily With Breakfast.  Dispense: 90 tablet; Refill: 1  - Cortisol - AM    5. Screening for thyroid disorder  - TSH  - T4, free    6. Vitamin D deficiency  - Vitamin D 25 hydroxy    7. Screening for iron deficiency anemia  - Iron Profile    8. Encounter for vitamin deficiency screening  - Vitamin B12  - Folate    A1c today of 6   Plans to start working on lifestyle changes and start exercising again to help with lab values.   BP up in office today. Has been taking medication as directed. Not checking BP at home. Will start logging and call if staying elevated.   F/u in 3 months. Will request lab work prior to appointment   Will obtain labs today at [facility] on Saturday when she is fasting     Counseling was given to patient for the following topics: diagnostic results, instructions for management, risk factor reductions, patient and family education, and impressions . Total time of the encounter was 20 minutes and 10 minutes was spent counseling.      CORTNEY Lomeli

## 2024-05-16 ENCOUNTER — TRANSCRIBE ORDERS (OUTPATIENT)
Dept: ADMINISTRATIVE | Facility: HOSPITAL | Age: 59
End: 2024-05-16
Payer: COMMERCIAL

## 2024-05-16 DIAGNOSIS — Z12.31 VISIT FOR SCREENING MAMMOGRAM: Primary | ICD-10-CM

## 2024-06-10 ENCOUNTER — HOSPITAL ENCOUNTER (OUTPATIENT)
Dept: MAMMOGRAPHY | Facility: HOSPITAL | Age: 59
Discharge: HOME | End: 2024-06-10
Admitting: NURSE PRACTITIONER
Payer: COMMERCIAL

## 2024-06-10 DIAGNOSIS — Z12.31 VISIT FOR SCREENING MAMMOGRAM: ICD-10-CM

## 2024-06-10 PROCEDURE — 77063 BREAST TOMOSYNTHESIS BI: CPT

## 2024-06-10 PROCEDURE — 77067 SCR MAMMO BI INCL CAD: CPT

## 2024-06-12 PROCEDURE — 77067 SCR MAMMO BI INCL CAD: CPT | Performed by: RADIOLOGY

## 2024-06-12 PROCEDURE — 77063 BREAST TOMOSYNTHESIS BI: CPT | Performed by: RADIOLOGY

## 2024-06-19 ENCOUNTER — OFFICE VISIT (OUTPATIENT)
Dept: FAMILY MEDICINE CLINIC | Facility: CLINIC | Age: 59
End: 2024-06-19
Payer: COMMERCIAL

## 2024-06-19 VITALS
HEART RATE: 73 BPM | SYSTOLIC BLOOD PRESSURE: 130 MMHG | OXYGEN SATURATION: 99 % | TEMPERATURE: 97.1 F | RESPIRATION RATE: 16 BRPM | BODY MASS INDEX: 26.12 KG/M2 | WEIGHT: 153 LBS | DIASTOLIC BLOOD PRESSURE: 82 MMHG | HEIGHT: 64 IN

## 2024-06-19 DIAGNOSIS — I10 ESSENTIAL (PRIMARY) HYPERTENSION: ICD-10-CM

## 2024-06-19 DIAGNOSIS — R73.03 PREDIABETES: Primary | ICD-10-CM

## 2024-06-19 DIAGNOSIS — E78.2 MIXED HYPERLIPIDEMIA: ICD-10-CM

## 2024-06-19 LAB
EXPIRATION DATE: ABNORMAL
HBA1C MFR BLD: 5.9 % (ref 4.5–5.7)
Lab: ABNORMAL

## 2024-06-19 RX ORDER — ATORVASTATIN CALCIUM 20 MG/1
20 TABLET, FILM COATED ORAL DAILY
Qty: 90 TABLET | Refills: 3 | Status: SHIPPED | OUTPATIENT
Start: 2024-06-19

## 2024-06-19 RX ORDER — LOSARTAN POTASSIUM 50 MG/1
50 TABLET ORAL DAILY
Qty: 90 TABLET | Refills: 3 | Status: SHIPPED | OUTPATIENT
Start: 2024-06-19

## 2024-06-19 NOTE — PROGRESS NOTES
"Subjective   Aysha Reaves is a 58 y.o. female.     Diabetes       Pt presents for follow up for HTN and prediabetes.  BP well controlled in office on current medication   Has been under more stress with father in law passing away and mother  in law being diagnosed with esophageal cancer.   Also is moving out of [place] soon to [place] [facility] [facility].   Unfortunately due to these stressors, has not been able to get on track with nutrition and exercise routine for blood sugar management. Still taking metformin       The following portions of the patient's history were reviewed and updated as appropriate: allergies, current medications, past family history, past medical history, past social history, past surgical history, and problem list.    Objective   Blood pressure 130/82, pulse 73, temperature 97.1 °F (36.2 °C), resp. rate 16, height 163.2 cm (64.25\"), weight 69.4 kg (153 lb), SpO2 99%.     Physical Exam  Vitals and nursing note reviewed.   Constitutional:       Appearance: Normal appearance.   Cardiovascular:      Rate and Rhythm: Normal rate and regular rhythm.   Pulmonary:      Effort: Pulmonary effort is normal.      Breath sounds: Normal breath sounds.   Neurological:      Mental Status: She is alert and oriented to person, place, and time.   Psychiatric:         Mood and Affect: Mood normal.         Behavior: Behavior normal.         Assessment & Plan   Diagnoses and all orders for this visit:    1. Prediabetes (Primary)  -     POC Glycosylated Hemoglobin (Hb A1C)  -     metFORMIN (GLUCOPHAGE) 500 MG tablet; Take 1 tablet by mouth Daily With Breakfast.  Dispense: 90 tablet; Refill: 3    2. Essential (primary) hypertension  -     losartan (COZAAR) 50 MG tablet; Take 1 tablet by mouth Daily.  Dispense: 90 tablet; Refill: 3    3. Mixed hyperlipidemia  -     atorvastatin (LIPITOR) 20 MG tablet; Take 1 tablet by mouth Daily.  Dispense: 90 tablet; Refill: 3    A1c stable and slight improved from last visit at 5.9% "   Continue on current treatment and work on getting on track with lifestyle changes   Refills of medications provided pending establishing with new provider out of state

## 2024-10-03 DIAGNOSIS — I10 ESSENTIAL (PRIMARY) HYPERTENSION: ICD-10-CM

## 2024-10-03 RX ORDER — LOSARTAN POTASSIUM 50 MG/1
50 TABLET ORAL DAILY
Qty: 90 TABLET | Refills: 3 | Status: SHIPPED | OUTPATIENT
Start: 2024-10-03

## 2024-10-06 DIAGNOSIS — R73.03 PREDIABETES: ICD-10-CM

## 2025-01-17 DIAGNOSIS — E78.2 MIXED HYPERLIPIDEMIA: ICD-10-CM

## 2025-01-19 RX ORDER — ATORVASTATIN CALCIUM 20 MG/1
20 TABLET, FILM COATED ORAL DAILY
Qty: 90 TABLET | Refills: 3 | Status: SHIPPED | OUTPATIENT
Start: 2025-01-19

## 2025-04-02 DIAGNOSIS — R73.03 PREDIABETES: ICD-10-CM

## 2025-04-02 NOTE — TELEPHONE ENCOUNTER
Pt will be seeing another provider in a few weeks in [place]. But still needs a short supply of metformin until then  Sending 3 months worth to hold her over.